# Patient Record
Sex: FEMALE | Race: WHITE | Employment: UNEMPLOYED | URBAN - METROPOLITAN AREA
[De-identification: names, ages, dates, MRNs, and addresses within clinical notes are randomized per-mention and may not be internally consistent; named-entity substitution may affect disease eponyms.]

---

## 2017-02-14 ENCOUNTER — TELEPHONE (OUTPATIENT)
Dept: INTERNAL MEDICINE CLINIC | Age: 27
End: 2017-02-14

## 2017-02-14 DIAGNOSIS — Z23 ENCOUNTER FOR IMMUNIZATION: Primary | ICD-10-CM

## 2017-02-14 NOTE — TELEPHONE ENCOUNTER
Per [facility] pharmacy, pt left med out at room temp and it is no good. Please send a new Rx to replace it. Vivotif EC Capsule  Qty: 4 Cap  Directions: Take 1 capsule by mouth every other day for 4 days. Take on an empty stomach. No hot beverages, alcohol, or antibiotics when capsule taken.     Last OV: 10/3/16  No future appts scheduled

## 2017-02-22 NOTE — TELEPHONE ENCOUNTER
----- Message from Pamela Heck sent at 2/22/2017 11:57 AM EST -----  Regarding: Dr Olvera/rx  Pt (p) [phone], pt said she will need another rx that was written for her during her last visit for her upcoming trip  to [facility] this year for Vivotif . The Typhoid  vaccine live oral.    She did not realize she needed to refrigerate it. So she will need another rx faxed to [facility] 60 (f) [phone], she will be leaving in 3 weeks so she will need as soon as possible.

## 2017-02-23 NOTE — TELEPHONE ENCOUNTER
Pt's checking the status of an order for the live oral Typhoid vaccine to be sent to [facility]. Pt need's to get this done ASAP pt leave's in three weeks to go to [facility]. Pt's # [phone] please call once submitted to the pharmacy.

## 2017-02-27 NOTE — TELEPHONE ENCOUNTER
Pt called to check the status of her Typhoid live oral vaccine to see if it had been sent to [facility]. Informed the pt that it had not been sent yet pt state's that she leave's next week for [facility] and really need's the Live Oral or the shot sent to the pharmacy. Pt's # [phone].

## 2017-02-28 ENCOUNTER — TELEPHONE (OUTPATIENT)
Dept: INTERNAL MEDICINE CLINIC | Age: 27
End: 2017-02-28

## 2017-02-28 NOTE — TELEPHONE ENCOUNTER
Patient request to speak with the nurse or Dr. Pawan Sarmiento in regards to her typhoid medication. Per pt, she states she will need another prescription and needs this ASAP. [place] to leave [place] and needs [place] medication before she leaves.   Patient request for a call back today at #[phone]

## 2017-03-01 ENCOUNTER — CLINICAL SUPPORT (OUTPATIENT)
Dept: INTERNAL MEDICINE CLINIC | Age: 27
End: 2017-03-01

## 2017-03-01 DIAGNOSIS — Z23 ENCOUNTER FOR IMMUNIZATION: Primary | ICD-10-CM

## 2017-09-01 ENCOUNTER — OFFICE VISIT (OUTPATIENT)
Dept: OBGYN CLINIC | Age: 27
End: 2017-09-01

## 2017-09-01 ENCOUNTER — PATIENT MESSAGE (OUTPATIENT)
Dept: OBGYN CLINIC | Age: 27
End: 2017-09-01

## 2017-09-01 VITALS
WEIGHT: 123.8 LBS | SYSTOLIC BLOOD PRESSURE: 108 MMHG | HEIGHT: 68 IN | HEART RATE: 70 BPM | BODY MASS INDEX: 18.76 KG/M2 | DIASTOLIC BLOOD PRESSURE: 70 MMHG

## 2017-09-01 DIAGNOSIS — N93.8 DUB (DYSFUNCTIONAL UTERINE BLEEDING): Primary | ICD-10-CM

## 2017-09-01 RX ORDER — CLOMIPHENE CITRATE 50 MG/1
50 TABLET ORAL DAILY
Qty: 5 TAB | Refills: 0 | Status: SHIPPED | OUTPATIENT
Start: 2017-09-01 | End: 2017-09-06

## 2017-09-01 NOTE — PROGRESS NOTES
Pt is here for a new gyn exam. Trying to get pregnant since may 2017. She is having irregular cycles, really painful. She is on her cycle today.

## 2017-09-01 NOTE — MR AVS SNAPSHOT
Visit Information Date & Time Provider Department Dept. Phone Encounter #  
 5/6/0400 52:70 AM Missy Lopez MD [facility] [place] [facility] [id number] [facility] [id number] Upcoming Health Maintenance Date Due INFLUENZA AGE 9 TO ADULT 8/1/2017 PAP AKA CERVICAL CYTOLOGY 9/1/2018 Allergies as of 9/1/2017  Review Complete On: 0/1/4613 By: Missy Lopez MD  
 No Known Allergies Current Immunizations  Reviewed on 10/3/2016 Name Date DTaP 5/13/1996, 8/14/1992, 1991, 1991, 1991 HPV 1/28/2009, 8/5/2008, 6/4/2008 Hep B Vaccine 12/28/1995, 7/21/1995, 6/9/1995 Hib 5/14/1992, 1991, 1991, 1991 Influenza Vaccine 11/23/2009 MMR 5/13/1996, 5/14/1992 Meningococcal (MCV4O) Vaccine 6/4/2008 Poliovirus vaccine 5/13/1996, 8/14/1992, 1991, 1991 Td 4/28/2004 Tdap 7/13/2009 Typhoid Vaccine, Live, Oral 3/1/2017 Not reviewed this visit Vitals BP Pulse Height(growth percentile) Weight(growth percentile) LMP BMI  
 108/70 (BP 1 Location: Left arm, BP Patient Position: Sitting) 70 5' 8.25\" (1.734 m) 123 lb 12.8 oz (56.2 kg) 09/01/2017 (Exact Date) 18.69 kg/m2 OB Status Smoking Status Having regular periods Never Smoker Vitals History BMI and BSA Data Body Mass Index Body Surface Area  
 18.69 kg/m 2 1.65 m 2 Preferred Pharmacy Pharmacy Name Phone [facility] [phone] Your Updated Medication List  
  
   
This list is accurate as of: 9/1/17 11:53 AM.  Always use your most recent med list.  
  
  
  
  
 PNV No12-Iron-FA-DSS-OM-3 29 mg iron-1 mg -50 mg Cpkd Take  by mouth. Introducing hospitals & HEALTH SERVICES! [employer] Wade introduces new test company patient portal. Now you can access parts of your medical record, email your doctor's office, and request medication refills online.    
 
1. In your internet browser, go to [email or web address] 2. Click on the First Time User? Click Here link in the Sign In box. You will see the New Member Sign Up page. 3. Enter your Modbook Access Code exactly as it appears below. You will not need to use this code after youve completed the sign-up process. If you do not sign up before the expiration date, you must request a new code. · Modbook Access Code: [email or web address] Expires: 11/30/2017 11:53 AM 
 
4. Enter the last four digits of your Social Security Number (xxxx) and Date of Birth (mm/dd/yyyy) as indicated and click Submit. You will be taken to the next sign-up page. 5. Create a [employer] ID. This will be your Modbook login ID and cannot be changed, so think of one that is secure and easy to remember. 6. Create a Modbook password. You can change your password at any time. 7. Enter your Password Reset Question and Answer. This can be used at a later time if you forget your password. 8. Enter your e-mail address. You will receive e-mail notification when new information is available in [address] 9. Click Sign Up. You can now view and download portions of your medical record. 10. Click the Download Summary menu link to download a portable copy of your medical information. If you have questions, please visit the Frequently Asked Questions section of the Modbook website. Remember, Modbook is NOT to be used for urgent needs. For medical emergencies, dial 911. Now available from your iPhone and Android! Please provide this summary of care documentation to your next provider. Your primary care clinician is listed as [address]. If you have any questions after today's visit, please call [phone].

## 2017-09-01 NOTE — PROGRESS NOTES
Infertility Evaluation     Van Fournier is a No obstetric history on file. ,  33 y.o. female [facility] Patient's last menstrual period was 09/01/2017 (exact date). who presents with had concerns including Gyn Exam..  Visit today will focus on concerns with annual in near future  She and her partner have been attempting pregnancy for 5 months. They have intercourse 3 times per week. Her menses occur monthly and she has not moliminal symptoms. (breast tenderness, mood changes, bloating, acne). She has not done ovulation testing at home. Her cycles are irregular. Had been on ocp but not on for past 2 years; condoms til recently      The patient denies risk factors for tubal cause of infertility. Her current partner has not successfully impregnated a previous partner in the past. He has the following risk factors for male infertility: none    Previous evaluation:She has not had a previous evaluation for infertility consisting of ovulation kits, basal body temperature charts, hysterosalpingogram, semen analysis. She has not consulted in the past with an infertility specialist.      History reviewed. No pertinent past medical history. Past Surgical History:   Procedure Laterality Date    HX WISDOM TEETH EXTRACTION       Social History     Occupational History    Not on file. Social History Main Topics    Smoking status: Never Smoker    Smokeless tobacco: Never Used    Alcohol use No    Drug use: No    Sexual activity: Yes     Partners: Male     Birth control/ protection: None     Family History   Problem Relation Age of Onset    No Known Problems Mother     No Known Problems Father     No Known Problems Sister        No Known Allergies  Prior to Admission medications    Medication Sig Start Date End Date Taking? Authorizing Provider   PNV No12-Iron-FA-DSS-OM-3 29 mg iron-1 mg -50 mg CPKD Take  by mouth.    Yes Historical Provider        Review of Systems: History obtained from the patient  Constitutional: negative for weight loss, fever, night sweats  Breast: negative for breast lumps, nipple discharge, galactorrhea  GI: negative for change in bowel habits, abdominal pain, black or bloody stools  : negative for frequency, dysuria, hematuria, vaginal discharge  MSK: negative for back pain, joint pain, muscle pain  Skin: negative for itching, rash, hives  Psych: negative for anxiety, depression, change in mood    Objective:    Visit Vitals    /70 (BP 1 Location: Left arm, BP Patient Position: Sitting)    Pulse 70    Ht 5' 8.25\" (1.734 m)    Wt 123 lb 12.8 oz (56.2 kg)    LMP 09/01/2017 (Exact Date)    BMI 18.69 kg/m2       Physical Exam:     Constitutional  · Appearance: well-nourished, well developed, alert, in no acute distress    HENT  · Head and Face: appears normal    ·      Gastrointestinal  · Abdominal Examination: abdomen non-tender to palpation, normal bowel sounds, no masses present  · Liver and spleen: no hepatomegaly present, spleen not palpable  · Hernias: no hernias identified    Genitourinary  · External Genitalia: normal appearance for age, no discharge present, no tenderness present, no inflammatory lesions present, no masses present, no atrophy present  · Vagina: normal vaginal vault without central or paravaginal defects, no discharge present, no inflammatory lesions present, no masses present  · Bladder: non-tender to palpation  · Urethra: appears normal  · Cervix: normal   · Uterus: normal size, shape and consistency  · Adnexa: no adnexal tenderness present, no adnexal masses present  · Perineum: perineum within normal limits, no evidence of trauma, no rashes or skin lesions present  · Anus: anus within normal limits, no hemorrhoids present  · Inguinal Lymph Nodes: no lymphadenopathy present    Skin  · General Inspection: no rash, no lesions identified    Neurologic/Psychiatric  · Mental Status:  · Orientation: grossly oriented to person, place and time  · Mood and Affect: mood normal, affect appropriate    Assessment:  Dysfunctional uterine bleeding and fertility concerns    Plan:   Reviewed history including long standing irregular menses and some findings c/w PCOS. Recommend reading the book, \"Taking charge of your fertility. \"  Check luteal phase labs; discussed possible use of clomid

## 2017-09-05 ENCOUNTER — LAB ONLY (OUTPATIENT)
Dept: OBGYN CLINIC | Age: 27
End: 2017-09-05

## 2017-09-05 DIAGNOSIS — N97.9 INFERTILITY, FEMALE: Primary | ICD-10-CM

## 2017-09-06 LAB
FSH SERPL-ACNC: 7.1 MIU/ML
LH SERPL-ACNC: 14.5 MIU/ML
PROLACTIN SERPL-MCNC: 10.2 NG/ML (ref 4.8–23.3)
TSH SERPL DL<=0.005 MIU/L-ACNC: 1.42 UIU/ML (ref 0.45–4.5)

## 2017-09-06 NOTE — PROGRESS NOTES
I've reviewed your results and all looks fine; if your cycles remain irregular, its safe to try clomid; check in with my nurse, Gaby Guy, with any questions

## 2017-09-24 ENCOUNTER — PATIENT MESSAGE (OUTPATIENT)
Dept: OBGYN CLINIC | Age: 27
End: 2017-09-24

## 2017-09-28 RX ORDER — CLOMIPHENE CITRATE 50 MG/1
50 TABLET ORAL DAILY
Qty: 5 TAB | Refills: 0 | Status: SHIPPED | OUTPATIENT
Start: 2017-09-28 | End: 2017-10-03

## 2018-08-15 LAB
ANTIBODY SCREEN, EXTERNAL: NEGATIVE
HBSAG, EXTERNAL: NEGATIVE
HCT, EXTERNAL: 41.9
HGB, EXTERNAL: 13.6
HIV, EXTERNAL: NEGATIVE
RPR, EXTERNAL: NORMAL
RUBELLA, EXTERNAL: NORMAL
T. PALLIDUM, EXTERNAL: NEGATIVE
TYPE, ABO & RH, EXTERNAL: NORMAL
URINALYSIS, EXTERNAL: NEGATIVE

## 2019-01-11 ENCOUNTER — APPOINTMENT (OUTPATIENT)
Dept: ULTRASOUND IMAGING | Age: 29
End: 2019-01-11
Attending: OBSTETRICS & GYNECOLOGY
Payer: COMMERCIAL

## 2019-01-11 ENCOUNTER — HOSPITAL ENCOUNTER (OUTPATIENT)
Age: 29
Setting detail: OBSERVATION
Discharge: HOME | End: 2019-01-12
Attending: OBSTETRICS & GYNECOLOGY | Admitting: OBSTETRICS & GYNECOLOGY
Payer: COMMERCIAL

## 2019-01-11 PROBLEM — N20.0 KIDNEY STONE COMPLICATING PREGNANCY, THIRD TRIMESTER: Status: ACTIVE | Noted: 2019-01-11

## 2019-01-11 PROBLEM — O26.833 KIDNEY STONE COMPLICATING PREGNANCY, THIRD TRIMESTER: Status: ACTIVE | Noted: 2019-01-11

## 2019-01-11 LAB
APPEARANCE UR: ABNORMAL
BACTERIA URNS QL MICRO: ABNORMAL /HPF
BILIRUB UR QL: NEGATIVE
COLOR UR: ABNORMAL
EPITH CASTS URNS QL MICRO: ABNORMAL /LPF
GLUCOSE UR STRIP.AUTO-MCNC: NEGATIVE MG/DL
HGB UR QL STRIP: ABNORMAL
KETONES UR QL STRIP.AUTO: 40 MG/DL
LEUKOCYTE ESTERASE UR QL STRIP.AUTO: ABNORMAL
NITRITE UR QL STRIP.AUTO: NEGATIVE
PH UR STRIP: 7.5 [PH] (ref 5–8)
PROT UR STRIP-MCNC: NEGATIVE MG/DL
RBC #/AREA URNS HPF: ABNORMAL /HPF (ref 0–5)
SP GR UR REFRACTOMETRY: 1.01 (ref 1–1.03)
UA: UC IF INDICATED,UAUC: ABNORMAL
UROBILINOGEN UR QL STRIP.AUTO: 0.2 EU/DL (ref 0.2–1)
WBC URNS QL MICRO: ABNORMAL /HPF (ref 0–4)
YEAST URNS QL MICRO: PRESENT

## 2019-01-11 PROCEDURE — 74011250636 HC RX REV CODE- 250/636: Performed by: OBSTETRICS & GYNECOLOGY

## 2019-01-11 PROCEDURE — 87086 URINE CULTURE/COLONY COUNT: CPT

## 2019-01-11 PROCEDURE — 81001 URINALYSIS AUTO W/SCOPE: CPT

## 2019-01-11 PROCEDURE — 99285 EMERGENCY DEPT VISIT HI MDM: CPT

## 2019-01-11 PROCEDURE — 74011000258 HC RX REV CODE- 258: Performed by: OBSTETRICS & GYNECOLOGY

## 2019-01-11 PROCEDURE — 99218 HC RM OBSERVATION: CPT

## 2019-01-11 PROCEDURE — 96376 TX/PRO/DX INJ SAME DRUG ADON: CPT

## 2019-01-11 PROCEDURE — 96361 HYDRATE IV INFUSION ADD-ON: CPT

## 2019-01-11 PROCEDURE — 76770 US EXAM ABDO BACK WALL COMP: CPT

## 2019-01-11 PROCEDURE — 96375 TX/PRO/DX INJ NEW DRUG ADDON: CPT

## 2019-01-11 PROCEDURE — 59025 FETAL NON-STRESS TEST: CPT

## 2019-01-11 PROCEDURE — 96365 THER/PROPH/DIAG IV INF INIT: CPT

## 2019-01-11 RX ORDER — SODIUM CHLORIDE, SODIUM LACTATE, POTASSIUM CHLORIDE, CALCIUM CHLORIDE 600; 310; 30; 20 MG/100ML; MG/100ML; MG/100ML; MG/100ML
125 INJECTION, SOLUTION INTRAVENOUS CONTINUOUS
Status: DISCONTINUED | OUTPATIENT
Start: 2019-01-11 | End: 2019-01-12 | Stop reason: HOSPADM

## 2019-01-11 RX ORDER — ACETAMINOPHEN 500 MG
1000 TABLET ORAL
COMMUNITY
End: 2019-03-20

## 2019-01-11 RX ORDER — ONDANSETRON 2 MG/ML
4 INJECTION INTRAMUSCULAR; INTRAVENOUS
Status: DISCONTINUED | OUTPATIENT
Start: 2019-01-11 | End: 2019-01-12 | Stop reason: HOSPADM

## 2019-01-11 RX ORDER — MORPHINE SULFATE 4 MG/ML
2 INJECTION INTRAVENOUS
Status: DISCONTINUED | OUTPATIENT
Start: 2019-01-11 | End: 2019-01-12 | Stop reason: HOSPADM

## 2019-01-11 RX ORDER — ZOLPIDEM TARTRATE 5 MG/1
5 TABLET ORAL
Status: DISCONTINUED | OUTPATIENT
Start: 2019-01-11 | End: 2019-01-12 | Stop reason: HOSPADM

## 2019-01-11 RX ADMIN — CEFTRIAXONE 2 G: 2 INJECTION, POWDER, FOR SOLUTION INTRAMUSCULAR; INTRAVENOUS at 20:33

## 2019-01-11 RX ADMIN — MORPHINE SULFATE 2 MG: 4 INJECTION, SOLUTION INTRAMUSCULAR; INTRAVENOUS at 18:02

## 2019-01-11 RX ADMIN — ONDANSETRON 4 MG: 2 INJECTION INTRAMUSCULAR; INTRAVENOUS at 18:02

## 2019-01-11 RX ADMIN — MORPHINE SULFATE 2 MG: 4 INJECTION, SOLUTION INTRAMUSCULAR; INTRAVENOUS at 22:45

## 2019-01-11 RX ADMIN — SODIUM CHLORIDE, SODIUM LACTATE, POTASSIUM CHLORIDE, AND CALCIUM CHLORIDE 125 ML/HR: 600; 310; 30; 20 INJECTION, SOLUTION INTRAVENOUS at 18:45

## 2019-01-11 RX ADMIN — ONDANSETRON 4 MG: 2 INJECTION INTRAMUSCULAR; INTRAVENOUS at 22:49

## 2019-01-11 RX ADMIN — MORPHINE SULFATE 2 MG: 4 INJECTION, SOLUTION INTRAMUSCULAR; INTRAVENOUS at 20:30

## 2019-01-11 NOTE — PROGRESS NOTES
1700 Patient arrives to L&D, reporting right flank pain. [phone] Dr. Katelin Vernon at bedside assessing the patient, and reviewing the NST., Dr. Katelin Vernon is explaining the course of treatment. Patient verbalizes understanding. 1905 Patient transport is transporting patient to [address] [facility] via a wheelchair. 1911 Verbal shift change report given to Jackson RN (oncoming nurse) by JOSEPH Wilson RN (offgoing nurse). Report included the following information SBAR, Kardex, Intake/Output, MAR, Accordion and Recent Results.

## 2019-01-11 NOTE — PROGRESS NOTES
Progress Note Pt presents to L&D triage with complaint of left sided flank pain that has worsened throughout the day. No obstetric complaints. CE in exam c/th/high. Uncomplicated pregnancy. No gross hematuria. Pain radiates to front left pelvis. No hx kidney stones. Vitals:  Blood pressure 116/70, pulse 75, temperature 99.1 °F (37.3 °C), resp. rate 16, height 5' 8\" (1.727 m), weight 69.4 kg (153 lb), SpO2 100 %. Temp (24hrs), Av.1 °F (37.3 °C), Min:99.1 °F (37.3 °C), Max:99.1 °F (37.3 °C) Exam:  Patient without distress. Abdomen soft,  nontender. Incision dry and clean without erythema. Lower extremities are negative for swelling, cords, or tenderness. Lab/Data Review: 
none Assessment and Plan:  IUP at 30 weeks with left flank pain 1. Check renal ultrasound to assess for calculi. 2.  IV hydration 3. Pain meds PRN 4.   NST cat 1

## 2019-01-11 NOTE — PROGRESS NOTES
- This RN orienting Derian Martin RN, charting and care performed by JOSEPH Wilson RN will be overseen by this nurse.

## 2019-01-12 VITALS
HEART RATE: 93 BPM | WEIGHT: 153 LBS | HEIGHT: 68 IN | SYSTOLIC BLOOD PRESSURE: 99 MMHG | OXYGEN SATURATION: 100 % | RESPIRATION RATE: 14 BRPM | TEMPERATURE: 98.1 F | BODY MASS INDEX: 23.19 KG/M2 | DIASTOLIC BLOOD PRESSURE: 60 MMHG

## 2019-01-12 PROCEDURE — 99218 HC RM OBSERVATION: CPT

## 2019-01-12 PROCEDURE — 96376 TX/PRO/DX INJ SAME DRUG ADON: CPT

## 2019-01-12 PROCEDURE — 74011250636 HC RX REV CODE- 250/636: Performed by: OBSTETRICS & GYNECOLOGY

## 2019-01-12 RX ORDER — NITROFURANTOIN 25; 75 MG/1; MG/1
100 CAPSULE ORAL 2 TIMES DAILY
Qty: 14 CAP | Refills: 0 | Status: SHIPPED | OUTPATIENT
Start: 2019-01-12 | End: 2019-01-19

## 2019-01-12 RX ADMIN — MORPHINE SULFATE 2 MG: 4 INJECTION, SOLUTION INTRAMUSCULAR; INTRAVENOUS at 01:22

## 2019-01-12 NOTE — DISCHARGE INSTRUCTIONS
Kidney Stone: Care Instructions  Your Care Instructions    Kidney stones are formed when salts, minerals, and other substances normally found in the urine clump together. They can be as small as grains of sand or, rarely, as large as golf balls. While the stone is traveling through the ureter, which is the tube that carries urine from the kidney to the bladder, you will probably feel pain. The pain may be mild or very severe. You may also have some blood in your urine. As soon as the stone reaches the bladder, any intense pain should go away. If a stone is too large to pass on its own, you may need a medical procedure to help you pass the stone. The doctor has checked you carefully, but problems can develop later. If you notice any problems or new symptoms, get medical treatment right away. Follow-up care is a key part of your treatment and safety. Be sure to make and go to all appointments, and call your doctor if you are having problems. It's also a good idea to know your test results and keep a list of the medicines you take. How can you care for yourself at home? · Drink plenty of fluids, enough so that your urine is light yellow or clear like water. If you have kidney, heart, or liver disease and have to limit fluids, talk with your doctor before you increase the amount of fluids you drink. · Take pain medicines exactly as directed. Call your doctor if you think you are having a problem with your medicine. ? If the doctor gave you a prescription medicine for pain, take it as prescribed. ? If you are not taking a prescription pain medicine, ask your doctor if you can take an over-the-counter medicine. Read and follow all instructions on the label. · Your doctor may ask you to strain your urine so that you can collect your kidney stone when it passes. You can use a kitchen strainer or a tea strainer to catch the stone. Store it in a plastic bag until you see your doctor again.   Preventing future kidney stones  Some changes in your diet may help prevent kidney stones. Depending on the cause of your stones, your doctor may recommend that you:  · Drink plenty of fluids, enough so that your urine is light yellow or clear like water. If you have kidney, heart, or liver disease and have to limit fluids, talk with your doctor before you increase the amount of fluids you drink. · Limit coffee, tea, and alcohol. Also avoid grapefruit juice. · Do not take more than the recommended daily dose of vitamins C and D.  · Avoid antacids such as Gaviscon, Maalox, Mylanta, or Tums. · Limit the amount of salt (sodium) in your diet. · Eat a balanced diet that is not too high in protein. · Limit foods that are high in a substance called oxalate, which can cause kidney stones. These foods include dark green vegetables, rhubarb, chocolate, wheat bran, nuts, cranberries, and beans. When should you call for help? Call your doctor now or seek immediate medical care if:    · You cannot keep down fluids.     · Your pain gets worse.     · You have a fever or chills.     · You have new or worse pain in your back just below your rib cage (the flank area).     · You have new or more blood in your urine.    Watch closely for changes in your health, and be sure to contact your doctor if:    · You do not get better as expected. Where can you learn more? Go to http://keshawn-isaac.info/. Enter O103 in the search box to learn more about \"Kidney Stone: Care Instructions. \"  Current as of: March 15, 2018  Content Version: 11.8  © 8524-8498 [employer]. Care instructions adapted under license by [employer] (which disclaims liability or warranty for this information). If you have questions about a medical condition or this instruction, always ask your healthcare professional. [address] any warranty or liability for your use of this information.      Pregnancy Precautions: Care Instructions  Your Care Instructions  There is no sure way to prevent labor before your due date ( labor) or to prevent most other pregnancy problems. But there are things you can do to increase your chances of a healthy pregnancy. Go to your appointments, follow your doctor's advice, and take good care of yourself. Eat well, and exercise (if your doctor agrees). And make sure to drink plenty of water. Follow-up care is a key part of your treatment and safety. Be sure to make and go to all appointments, and call your doctor if you are having problems. It's also a good idea to know your test results and keep a list of the medicines you take. How can you care for yourself at home? · Make sure you go to your prenatal appointments. At each visit, your doctor will check your blood pressure. Your doctor will also check to see if you have protein in your urine. High blood pressure and protein in urine are signs of preeclampsia. This condition can be dangerous for you and your baby. · Drink plenty of fluids, enough so that your urine is light yellow or clear like water. Dehydration can cause contractions. If you have kidney, heart, or liver disease and have to limit fluids, talk with your doctor before you increase the amount of fluids you drink. · Tell your doctor right away if you notice any symptoms of an infection, such as:  ? Burning when you urinate. ? A foul-smelling discharge from your vagina. ? Vaginal itching. ? Unexplained fever. ? Unusual pain or soreness in your uterus or lower belly. · Eat a balanced diet. Include plenty of foods that are high in calcium and iron. ? Foods high in calcium include milk, cheese, yogurt, almonds, and broccoli. ? Foods high in iron include red meat, shellfish, poultry, eggs, beans, raisins, whole-grain bread, and leafy green vegetables. · Do not smoke. If you need help quitting, talk to your doctor about stop-smoking programs and medicines.  These can increase your chances of quitting for good. · Do not drink alcohol or use illegal drugs. · Follow your doctor's directions about activity. Your doctor will let you know how much, if any, exercise you can do. · Ask your doctor if you can have sex. If you are at risk for early labor, your doctor may ask you to not have sex. · Take care to prevent falls. During pregnancy, your joints are loose, and your balance is off. Sports such as bicycling, skiing, or in-line skating can increase your risk of falling. And don't ride horses or motorcycles, dive, water ski, scuba dive, or parachute jump while you are pregnant. · Avoid getting very hot. Do not use saunas or hot tubs. Avoid staying out in the sun in hot weather for long periods. Take acetaminophen (Tylenol) to lower a high fever. · Do not take any over-the-counter or herbal medicines or supplements without talking to your doctor or pharmacist first.  When should you call for help? Call 911 anytime you think you may need emergency care. For example, call if:    · You passed out (lost consciousness).     · You have severe vaginal bleeding.     · You have severe pain in your belly or pelvis.     · You have had fluid gushing or leaking from your vagina and you know or think the umbilical cord is bulging into your vagina. If this happens, immediately get down on your knees so your rear end (buttocks) is higher than your head. This will decrease the pressure on the cord until help arrives.   [facility] your doctor now or seek immediate medical care if:    · You have signs of preeclampsia, such as:  ? Sudden swelling of your face, hands, or feet. ? New vision problems (such as dimness or blurring). ? A severe headache.     · You have any vaginal bleeding.     · You have belly pain or cramping.     · You have a fever.     · You have had regular contractions (with or without pain) for an hour.  This means that you have 8 or more within 1 hour or 4 or more in 20 minutes after you change your position and drink fluids.     · You have a sudden release of fluid from your vagina.     · You have low back pain or pelvic pressure that does not go away.     · You notice that your baby has stopped moving or is moving much less than normal.    Watch closely for changes in your health, and be sure to contact your doctor if you have any problems. Where can you learn more? Go to http://keshawn-isaac.info/. Enter [id number] in the search box to learn more about \"Pregnancy Precautions: Care Instructions. \"  Current as of: November 21, 2017  Content Version: 11.8  © 6318-9385 [employer]. Care instructions adapted under license by [employer] (which disclaims liability or warranty for this information). If you have questions about a medical condition or this instruction, always ask your healthcare professional. [address] any warranty or liability for your use of this information. Counting Your Baby's Kicks: Care Instructions  Your Care Instructions  Counting your baby's kicks is one way your doctor can tell that your baby is healthy. Most women--especially in a first pregnancy--feel their baby move for the first time between 16 and 22 weeks. The movement may feel like flutters rather than kicks. Your baby may move more at certain times of the day. When you are active, you may notice less kicking than when you are resting. At your prenatal visits, your doctor will ask whether the baby is active. In your last trimester, your doctor may ask you to count the number of times you feel your baby move. Follow-up care is a key part of your treatment and safety. Be sure to make and go to all appointments, and call your doctor if you are having problems. It's also a good idea to know your test results and keep a list of the medicines you take. How do you count fetal kicks?   · A common method of checking your baby's movement is to count the number of kicks or moves you feel in 1 hour. Ten movements (such as kicks, flutters, or rolls) in 1 hour are normal. Some doctors suggest that you count in the morning until you get to 10 movements. Then you can quit for that day and start again the next day. · Pick your baby's most active time of day to count. This may be any time from morning to evening. · If you do not feel 10 movements in an hour, your baby may be sleeping. Wait for the next hour and count again. When should you call for help? Call your doctor now or seek immediate medical care if:    · You noticed that your baby has stopped moving or is moving much less than normal.    Watch closely for changes in your health, and be sure to contact your doctor if you have any problems. Where can you learn more? Go to http://keshawn-isaac.info/. Enter W129 in the search box to learn more about \"Counting Your Baby's Kicks: Care Instructions. \"  Current as of: November 21, 2017  Content Version: 11.8  © 0303-7286 [employer]. Care instructions adapted under license by [employer] (which disclaims liability or warranty for this information). If you have questions about a medical condition or this instruction, always ask your healthcare professional. [address] any warranty or liability for your use of this information. Weeks 30 to 32 of Your Pregnancy: Care Instructions  Your Care Instructions    You have made it to the final months of your pregnancy. By now, your baby is really starting to look like a baby, with hair and plump skin. As you enter the final weeks of pregnancy, the reality of having a baby may start to set in. This is the time to settle on a name, get your household in order, set up a safe nursery, and find quality  if needed. Doing these things in advance will allow you to focus on caring for and enjoying your new baby.  You may also want to have a tour of your hospital's labor and delivery unit to get a better idea of what to expect while you are in the hospital.  During these last months, it is very important to take good care of yourself and pay attention to what your body needs. If your doctor says it is okay for you to work, don't push yourself too hard. Use the tips provided in this care sheet to ease heartburn and care for varicose veins. If you haven't already had the Tdap shot during this pregnancy, talk to your doctor about getting it. It will help protect your  against pertussis infection. Follow-up care is a key part of your treatment and safety. Be sure to make and go to all appointments, and call your doctor if you are having problems. It's also a good idea to know your test results and keep a list of the medicines you take. How can you care for yourself at home? Pay attention to your baby's movements  · You should feel your baby move several times every day. · Your baby now turns less, and kicks and jabs more. · Your baby sleeps 20 to 45 minutes at a time and is more active at certain times of day. · If your doctor wants you to count your baby's kicks:  ? Empty your bladder, and lie on your side or relax in a comfortable chair. ? Write down your start time. ? Pay attention only to your baby's movements. Count any movement except hiccups. ? After you have counted 10 movements, write down your stop time. ? Write down how many minutes it took for your baby to move 10 times. ? If an hour goes by and you have not recorded 10 movements, have something to eat or drink and then count for another hour. If you do not record 10 movements in either hour, call your doctor. Ease heartburn  · Eat small, frequent meals. · Do not eat chocolate, peppermint, or very spicy foods. Avoid drinks with caffeine, such as coffee, tea, and sodas. · Avoid bending over or lying down after meals. · Talk a short walk after you eat.   · If heartburn is a problem at night, do not eat for 2 hours before bedtime. · Take antacids like Mylanta, Maalox, Rolaids, or Tums. Do not take antacids that have sodium bicarbonate. Care for varicose veins  · Varicose veins are blood vessels that stretch out with the extra blood during pregnancy. Your legs may ache or throb. Most varicose veins will go away after the birth. · Avoid standing for long periods of time. Sit with your legs crossed at the ankles, not the knees. · Sit with your feet propped up. · Avoid tight clothing or stockings. Wear support hose. · Exercise regularly. Try walking for at least 30 minutes a day. Where can you learn more? Go to http://keshawn-isaac.info/. Enter I815 in the search box to learn more about \"Weeks 30 to 32 of Your Pregnancy: Care Instructions. \"  Current as of: November 21, 2017  Content Version: 11.8  © 1984-6239 [employer]. Care instructions adapted under license by [employer] (which disclaims liability or warranty for this information). If you have questions about a medical condition or this instruction, always ask your healthcare professional. [place] [address] any warranty or liability for your use of this information.

## 2019-01-12 NOTE — PROGRESS NOTES
0730 Shift assessment done. Plan of care discussed/call bell within reach. Pt states her right sided flank pain has improved and is now 3/10. Pt is voiding without difficulty and has been passing sediment in her urine. Pt ordered breakfast. NST to follow.

## 2019-01-12 NOTE — PROGRESS NOTES
Dr Lei Deanna made aware of pt ua results, order received to give Rocephine iv. Pharmacy called for dose clarification and order placed. 2230 Pt up to the bathroom. 2235 Pt voided 200cc dark yellow urine, small stone fragment noted upon straining urine.

## 2019-01-12 NOTE — PROGRESS NOTES
AntePartum Progress Note Jose Miguel Dejesus 30w3d Patient admitted for flank pain 30w3d Estimated Date of Delivery: 3/20/19 states she does have rt flank pain. Vitals:   
Patient Vitals for the past 24 hrs: 
 BP Temp Pulse Resp SpO2 Height Weight 19 0936 95/53 (P) 98.6 °F (37 °C) 79      
19 0728 97/57 98.2 °F (36.8 °C) 80 16     
19 0120  98.1 °F (36.7 °C)  16     
19 2038 108/60 98.8 °F (37.1 °C) 85 16     
19 1834     100 %    
19 1829     100 %    
19 1824     100 %    
19 1819     100 %    
19 1814     100 %    
19 1809     100 %    
19 1804     100 %    
19 1759     100 %    
19 1754     100 %    
19 1749     96 %    
19 1742     (!) 79 %    
19 1738 112/65 99.4 °F (37.4 °C) 81 16     
19 1737     100 %    
19 1707      5' 8\" (1.727 m) 69.4 kg (153 lb) 19 1704     100 %    
19 1703 116/70 99.1 °F (37.3 °C) 75 16     
19 1702  99.1 °F (37.3 °C)      Temp (24hrs), Av.8 °F (37.1 °C), Min:98.1 °F (36.7 °C), Max:99.4 °F (37.4 °C) I&O:   No intake/output data recorded. 01/10 1901 -  0700 In: -  
Out: 500 [Urine:500] NST:  Reactive Uterine Activity: None Exam:  Patient without distress. Abdomen soft, non-tender Fundus soft and non tender Lower extremities edema 1+ Labs:  
Recent Results (from the past 24 hour(s)) URINALYSIS W/ REFLEX CULTURE Collection Time: 19  6:14 PM  
Result Value Ref Range Color YELLOW/STRAW Appearance CLOUDY (A) CLEAR Specific gravity 1.015 1.003 - 1.030    
 pH (UA) 7.5 5.0 - 8.0 Protein NEGATIVE  NEG mg/dL Glucose NEGATIVE  NEG mg/dL Ketone 40 (A) NEG mg/dL  Bilirubin NEGATIVE  NEG    
 Blood SMALL (A) NEG Urobilinogen 0.2 0.2 - 1.0 EU/dL Nitrites NEGATIVE  NEG Leukocyte Esterase MODERATE (A) NEG    
 WBC 5-10 0 - 4 /hpf  
 RBC 5-10 0 - 5 /hpf Epithelial cells MODERATE (A) FEW /lpf Bacteria 2+ (A) NEG /hpf  
 UA:UC IF INDICATED URINE CULTURE ORDERED (A) CNI Yeast w/hyphae PRESENT (A) NEG Assessment and Plan:   IUP @ 30w3d Patient Active Problem List  
 Diagnosis Date Noted  Kidney stone complicating pregnancy, third trimester 01/11/2019  
 
renal stone, passed Feels much better. Got one dose of Rocephin, tolerating po. Pain much better. Wants to go home. Rx Macrobid.

## 2019-01-12 NOTE — PROGRESS NOTES
1627 - Dr. Alexander Said at bedside discussing [address] with patient. VORB to DC patient home with antibiotic prescription and FU next week with Dr. Paul Rod. 1020 - I have reviewed discharge instructions with the patient and spouse including kidney stone information, pregnancy precautions, kick counts, week 30-32 pregnancy, follow-up care instructions and mychart. The patient and spouse verbalized understanding and had no additional questions. Prescription given to patient to take home. 1023 - patient ambulating off unit at this time with FOB, personal belongings, prescription and discharge paperwork.

## 2019-01-13 LAB
BACTERIA SPEC CULT: NORMAL
CC UR VC: NORMAL
SERVICE CMNT-IMP: NORMAL

## 2019-02-21 LAB — GRBS, EXTERNAL: NEGATIVE

## 2019-03-17 ENCOUNTER — ANESTHESIA EVENT (OUTPATIENT)
Dept: LABOR AND DELIVERY | Age: 29
End: 2019-03-17
Payer: COMMERCIAL

## 2019-03-17 ENCOUNTER — ANESTHESIA (OUTPATIENT)
Dept: LABOR AND DELIVERY | Age: 29
End: 2019-03-17
Payer: COMMERCIAL

## 2019-03-17 ENCOUNTER — HOSPITAL ENCOUNTER (INPATIENT)
Age: 29
LOS: 3 days | Discharge: HOME | End: 2019-03-20
Attending: OBSTETRICS & GYNECOLOGY | Admitting: OBSTETRICS & GYNECOLOGY
Payer: COMMERCIAL

## 2019-03-17 DIAGNOSIS — Z34.90 PREGNANCY, UNSPECIFIED GESTATIONAL AGE: Primary | ICD-10-CM

## 2019-03-17 LAB
ERYTHROCYTE [DISTWIDTH] IN BLOOD BY AUTOMATED COUNT: 13.7 % (ref 11.5–14.5)
HCT VFR BLD AUTO: 39.9 % (ref 35–47)
HGB BLD-MCNC: 13.8 G/DL (ref 11.5–16)
MCH RBC QN AUTO: 32.3 PG (ref 26–34)
MCHC RBC AUTO-ENTMCNC: 34.6 G/DL (ref 30–36.5)
MCV RBC AUTO: 93.4 FL (ref 80–99)
NRBC # BLD: 0 K/UL (ref 0–0.01)
NRBC BLD-RTO: 0 PER 100 WBC
PLATELET # BLD AUTO: 185 K/UL (ref 150–400)
PMV BLD AUTO: 9.2 FL (ref 8.9–12.9)
RBC # BLD AUTO: 4.27 M/UL (ref 3.8–5.2)
WBC # BLD AUTO: 16.6 K/UL (ref 3.6–11)

## 2019-03-17 PROCEDURE — 74011000250 HC RX REV CODE- 250

## 2019-03-17 PROCEDURE — 77030014125 HC TY EPDRL BBMI -B: Performed by: ANESTHESIOLOGY

## 2019-03-17 PROCEDURE — 00HU33Z INSERTION OF INFUSION DEVICE INTO SPINAL CANAL, PERCUTANEOUS APPROACH: ICD-10-PCS | Performed by: ANESTHESIOLOGY

## 2019-03-17 PROCEDURE — 77010026065 HC OXYGEN MINIMUM MEDICAL AIR: Performed by: OBSTETRICS & GYNECOLOGY

## 2019-03-17 PROCEDURE — 77030011943

## 2019-03-17 PROCEDURE — 75410000003 HC RECOV DEL/VAG/CSECN EA 0.5 HR: Performed by: OBSTETRICS & GYNECOLOGY

## 2019-03-17 PROCEDURE — 51701 INSERT BLADDER CATHETER: CPT

## 2019-03-17 PROCEDURE — 74011000250 HC RX REV CODE- 250: Performed by: ANESTHESIOLOGY

## 2019-03-17 PROCEDURE — 75410000000 HC DELIVERY VAGINAL/SINGLE: Performed by: OBSTETRICS & GYNECOLOGY

## 2019-03-17 PROCEDURE — 99285 EMERGENCY DEPT VISIT HI MDM: CPT

## 2019-03-17 PROCEDURE — 85027 COMPLETE CBC AUTOMATED: CPT

## 2019-03-17 PROCEDURE — 65270000029 HC RM PRIVATE

## 2019-03-17 PROCEDURE — 36415 COLL VENOUS BLD VENIPUNCTURE: CPT

## 2019-03-17 PROCEDURE — 74011250636 HC RX REV CODE- 250/636: Performed by: OBSTETRICS & GYNECOLOGY

## 2019-03-17 PROCEDURE — 65410000002 HC RM PRIVATE OB

## 2019-03-17 PROCEDURE — 76060000078 HC EPIDURAL ANESTHESIA: Performed by: ANESTHESIOLOGY

## 2019-03-17 PROCEDURE — 75410000002 HC LABOR FEE PER 1 HR: Performed by: OBSTETRICS & GYNECOLOGY

## 2019-03-17 PROCEDURE — 51798 US URINE CAPACITY MEASURE: CPT

## 2019-03-17 PROCEDURE — 59025 FETAL NON-STRESS TEST: CPT

## 2019-03-17 RX ORDER — NALOXONE HYDROCHLORIDE 0.4 MG/ML
0.4 INJECTION, SOLUTION INTRAMUSCULAR; INTRAVENOUS; SUBCUTANEOUS AS NEEDED
Status: DISCONTINUED | OUTPATIENT
Start: 2019-03-17 | End: 2019-03-18 | Stop reason: HOSPADM

## 2019-03-17 RX ORDER — SODIUM CHLORIDE, SODIUM LACTATE, POTASSIUM CHLORIDE, CALCIUM CHLORIDE 600; 310; 30; 20 MG/100ML; MG/100ML; MG/100ML; MG/100ML
125 INJECTION, SOLUTION INTRAVENOUS CONTINUOUS
Status: DISCONTINUED | OUTPATIENT
Start: 2019-03-17 | End: 2019-03-20 | Stop reason: HOSPADM

## 2019-03-17 RX ORDER — OXYTOCIN/0.9 % SODIUM CHLORIDE 30/500 ML
0-20 PLASTIC BAG, INJECTION (ML) INTRAVENOUS
Status: DISCONTINUED | OUTPATIENT
Start: 2019-03-17 | End: 2019-03-20 | Stop reason: HOSPADM

## 2019-03-17 RX ORDER — HYDROMORPHONE HYDROCHLORIDE 2 MG/ML
1 INJECTION, SOLUTION INTRAMUSCULAR; INTRAVENOUS; SUBCUTANEOUS ONCE
Status: COMPLETED | OUTPATIENT
Start: 2019-03-17 | End: 2019-03-17

## 2019-03-17 RX ORDER — NALOXONE HYDROCHLORIDE 0.4 MG/ML
0.4 INJECTION, SOLUTION INTRAMUSCULAR; INTRAVENOUS; SUBCUTANEOUS AS NEEDED
Status: DISCONTINUED | OUTPATIENT
Start: 2019-03-17 | End: 2019-03-17 | Stop reason: SDUPTHER

## 2019-03-17 RX ORDER — FENTANYL/BUPIVACAINE/NS/PF 2-1250MCG
1-16 PREFILLED PUMP RESERVOIR EPIDURAL CONTINUOUS
Status: DISCONTINUED | OUTPATIENT
Start: 2019-03-17 | End: 2019-03-18 | Stop reason: HOSPADM

## 2019-03-17 RX ORDER — BUPIVACAINE HYDROCHLORIDE 2.5 MG/ML
INJECTION, SOLUTION EPIDURAL; INFILTRATION; INTRACAUDAL AS NEEDED
Status: DISCONTINUED | OUTPATIENT
Start: 2019-03-17 | End: 2019-03-18 | Stop reason: HOSPADM

## 2019-03-17 RX ORDER — ONDANSETRON 2 MG/ML
4 INJECTION INTRAMUSCULAR; INTRAVENOUS
Status: DISCONTINUED | OUTPATIENT
Start: 2019-03-17 | End: 2019-03-18 | Stop reason: HOSPADM

## 2019-03-17 RX ORDER — EPHEDRINE SULFATE 50 MG/ML
INJECTION, SOLUTION INTRAVENOUS
Status: DISCONTINUED
Start: 2019-03-17 | End: 2019-03-17 | Stop reason: WASHOUT

## 2019-03-17 RX ORDER — LIDOCAINE HYDROCHLORIDE AND EPINEPHRINE 20; 5 MG/ML; UG/ML
INJECTION, SOLUTION EPIDURAL; INFILTRATION; INTRACAUDAL; PERINEURAL AS NEEDED
Status: DISCONTINUED | OUTPATIENT
Start: 2019-03-17 | End: 2019-03-18 | Stop reason: HOSPADM

## 2019-03-17 RX ORDER — SODIUM CHLORIDE 0.9 % (FLUSH) 0.9 %
5-40 SYRINGE (ML) INJECTION AS NEEDED
Status: DISCONTINUED | OUTPATIENT
Start: 2019-03-17 | End: 2019-03-20 | Stop reason: HOSPADM

## 2019-03-17 RX ADMIN — OXYTOCIN-SODIUM CHLORIDE 0.9% IV SOLN 30 UNIT/500ML 2 MILLI-UNITS/MIN: 30-0.9/5 SOLUTION at 21:55

## 2019-03-17 RX ADMIN — SODIUM CHLORIDE, SODIUM LACTATE, POTASSIUM CHLORIDE, AND CALCIUM CHLORIDE 999 ML/HR: 600; 310; 30; 20 INJECTION, SOLUTION INTRAVENOUS at 12:24

## 2019-03-17 RX ADMIN — BUPIVACAINE HYDROCHLORIDE 4 ML: 2.5 INJECTION, SOLUTION EPIDURAL; INFILTRATION; INTRACAUDAL at 13:35

## 2019-03-17 RX ADMIN — LIDOCAINE HYDROCHLORIDE AND EPINEPHRINE 3 ML: 20; 5 INJECTION, SOLUTION EPIDURAL; INFILTRATION; INTRACAUDAL; PERINEURAL at 13:35

## 2019-03-17 RX ADMIN — Medication 12 ML/HR: at 19:59

## 2019-03-17 RX ADMIN — SODIUM CHLORIDE, SODIUM LACTATE, POTASSIUM CHLORIDE, AND CALCIUM CHLORIDE 500 ML/HR: 600; 310; 30; 20 INJECTION, SOLUTION INTRAVENOUS at 23:51

## 2019-03-17 RX ADMIN — SODIUM CHLORIDE, SODIUM LACTATE, POTASSIUM CHLORIDE, AND CALCIUM CHLORIDE 125 ML/HR: 600; 310; 30; 20 INJECTION, SOLUTION INTRAVENOUS at 15:00

## 2019-03-17 RX ADMIN — HYDROMORPHONE HYDROCHLORIDE 1 MG: 2 INJECTION INTRAMUSCULAR; INTRAVENOUS; SUBCUTANEOUS at 11:52

## 2019-03-17 RX ADMIN — Medication 12 ML/HR: at 13:42

## 2019-03-17 RX ADMIN — SODIUM CHLORIDE, SODIUM LACTATE, POTASSIUM CHLORIDE, AND CALCIUM CHLORIDE 999 ML/HR: 600; 310; 30; 20 INJECTION, SOLUTION INTRAVENOUS at 11:31

## 2019-03-17 NOTE — PROGRESS NOTES
185: Bedside and Verbal shift change report given to TATE Bowling RN (oncoming nurse) by Dina Barber RN (offgoing nurse). Report included the following information SBAR, Kardex, Intake/Output, MAR, Accordion, Recent Results and Med Rec Status. Assumed care of pt at this time. Assessment performed. Pt denies HA, visual disturbances, RUQ pain. Pt denies need of anything at this time. : Ciera notified that pt to begin pushing. : Handle bars    : Tug of war. : Call placed to [facility] MD for pitocin orders and reduce epidural in half. TORB     2340: MD at bedside. IUPC placed. Pt to rest until adequate ctx. 0015: Ice cloths applied to abdomen. 4884: MD called regarding pt fever. TORB 1g tylenol now. 0136: Ciera calls to nursing station for pt update. RN to perform SVE at this time. 0142: Pt begins pushing again at this time. 8894Jose Grimm MD at bedside to evaluate pt. Pt to continue pushing at this time. 0091: Straight cath attempt. 20cc bloody urine. 6555: Mec noted by PHYLICIA Harding.Nini: MD at bedside     0329: Vacuum applied e2hwxci before x1 pop off. Vacuum reapplied. 3550: Episiotomy performed by [facility] MD      8788: Delivery of viable  females placed skin to skin with mother. Delayed cord clamping performed. FOB cuts cord. QBL 140mL     0715: Bedside and Verbal shift change report given to Parul Hernández (oncoming nurse) by Sarika Burns RN  (offgoing nurse). Report included the following information SBAR, Kardex, Intake/Output, MAR, Recent Results and Med Rec Status.

## 2019-03-17 NOTE — PROGRESS NOTES
80 Dr Rahat Lr at bedside to assess labor progress. SVE 8/100/0. AROM/clear    1700 Pt sitting up in high fowlers drinking broth. 1044 [address] Dr Rahat Lr at bedside.   SVE 8-9/100/+1

## 2019-03-17 NOTE — ANESTHESIA PROCEDURE NOTES
Epidural Block    Start time: 3/17/2019 1:27 PM  End time: 3/17/2019 1:45 PM  Performed by: Fabian Kimbrough MD  Authorized by:  Fabian Kimbrough MD     Pre-Procedure  Indication: labor epidural    Preanesthetic Checklist: patient identified, risks and benefits discussed, anesthesia consent, timeout performed and anesthesia consent      Epidural:   Patient position:  Seated  Prep region:  Lumbar  Prep: Betadine    Location:  L3-4    Needle and Epidural Catheter:   Needle Type:  Tuohy  Needle Gauge:  17 G  Injection Technique:  Loss of resistance using saline  Attempts:  1  Catheter Size:  18 G  Events: no paresthesia and negative aspiration test    Test Dose:  Lidocaine 1.5% w/ epi and negative    Assessment:   Catheter Secured:  Tegaderm and tape  Insertion:  Uncomplicated  Patient tolerance:  Patient tolerated the procedure well with no immediate complications

## 2019-03-17 NOTE — PROGRESS NOTES
Labor Note    Mae Sos  [id number]  1990   39w4d      S:  Feeling comfortable with epidural    O:    Visit Vitals  BP (!) 85/53   Pulse 65   Temp (P) 97.8 °F (36.6 °C)   Resp 16   Ht 5' 8\" (1.727 m)   Wt 73.5 kg (162 lb)   SpO2 98%   BMI 24.63 kg/m²     Cervical Exam  Dilation (cm): /0  Vaginal exam done by? : Bee Fan MD   Membrane Status: AROM - clear     Patient Vitals for the past 4 hrs: Mode Fetal Heart Rate Fetal Activity Variability Decelerations Accelerations RN Reviewed Strip? Provider who reviewed strip?   19 1515 External 135  6-25 BPM None No Yes    19 1500       Yes    19 1445 External 135  6-25 BPM None Yes Yes    19 1415 External 135 Present 6-25 BPM None Yes Yes    19 1400       Yes    19 1345 External 130  6-25 BPM None Yes Yes Dr. Bee Fan   19 1330 External 125  6-25 BPM None Yes Yes    19 1300 External 130  6-25 BPM None No Yes Dr. Bee Fan   19 1230 External 130  6-25 BPM None No Yes Dr. Bee Fan   19 1224 Kyra Horde Yes Dr. Bee Fan       A/P:  30 y.o.  @ 39w4d - labor   1. CEFM/[place]  2. GBS - / Rh+  3. Pitocin not indicated   4. Pain control - epidural   5. David prn. Expect .       Marla Lugo MD  [facility]

## 2019-03-17 NOTE — H&P
History & Physical    Name: Mally Fairbanks MRN: [id number]  SSN: [id number]    YOB: 1990  Age: 30 y.o. Sex: female        Subjective:     Estimated Date of Delivery: 3/20/19  OB History    Para Term  AB Living   1             SAB TAB Ectopic Molar Multiple Live Births                    # Outcome Date GA Lbr Tramaine/2nd Weight Sex Delivery Anes PTL Lv   1 Current                   Ms. Tammy Walker is admitted with pregnancy at 39w4d for active labor. Prenatal course was c/b renal stone. Please see prenatal records for details. Came into L&D today with c/o ctx. Was 2cm on arrival, however changed to 4cm with walking and ctx intensified. Past Medical History:   Diagnosis Date    Kidney stone complicating pregnancy, third trimester 2019     Past Surgical History:   Procedure Laterality Date    HX OTHER SURGICAL  2009    HX WISDOM TEETH EXTRACTION      HX WISDOM TEETH EXTRACTION       Social History     Occupational History    Not on file   Tobacco Use    Smoking status: Never Smoker    Smokeless tobacco: Never Used   Substance and Sexual Activity    Alcohol use: No    Drug use: No    Sexual activity: Yes     Partners: Male     Birth control/protection: None     Family History   Problem Relation Age of Onset    No Known Problems Mother     No Known Problems Father     No Known Problems Sister        No Known Allergies  Prior to Admission medications    Medication Sig Start Date End Date Taking? Authorizing Provider   PNV No12-Iron-FA-DSS-OM-3 29 mg iron-1 mg -50 mg CPKD Take  by mouth. Yes Provider, Historical   acetaminophen (TYLENOL EXTRA STRENGTH) 500 mg tablet Take 1,000 mg by mouth every six (6) hours as needed for Pain. Provider, Historical        Review of Systems: A comprehensive review of systems was negative except for that written in the HPI.     Objective:     Vitals:  Vitals:    19 1139 19 1144 19 1149 19 1154   BP: Pulse:       Resp:       Temp:       SpO2: 100% 100% 96% 100%   Weight:       Height:            Physical Exam:  Patient without distress. Heart: Regular rate and rhythm  Lung: normal respiratory effort  Abdomen: soft, nontender  Fundus: soft and non tender  Perineum: blood absent, amniotic fluid absent  Cervical Exam: 4/80/-1 per RN   Lower Extremities: WWP   Membranes:  Intact  Fetal Heart Rate: Reactive    Prenatal Labs:   Lab Results   Component Value Date/Time    Rubella, External Immune 08/15/2018    GrBStrep, External Negative 02/21/2019    HBsAg, External Negative 08/15/2018    HIV, External Negative 08/15/2018    RPR, External Non-Reactive 08/15/2018    ABO,Rh AB Positive 08/15/2018         Assessment/Plan:     Active Problems:  IUP @ 39w4d   Labor        Plan: Admit for Reassuring fetal status, Labor  Progressing normally  Continue expectant management, Continue plan for vaginal delivery. Group B Strep was negative.   Consented  IEFM/Tocol   Considering epidural     Signed By:  Dot Chavez MD     March 17, 2019

## 2019-03-17 NOTE — ROUTINE PROCESS
4908: Patient admitted to triage following complaints of natalie q 6-7 minutes since 0430 this morning. Pt states no leaking of vaginal fluid or bleeding. Pt changed into gown and oriented to room and unit. Plan of care discussed with pt, pt verbalizing understanding. 0730: Pt placed on FHR and toco monitor, vital signs taken and assessment completed    0734: SVE per this RN, this RN unable to reach posterior cervix    0736: SVE per PEGGY Bustos, 2/80/posterior cervix noted    2303: This RN calling Dr. Patti Garcia at this time to update MD on pt status and SVE, MD TORB allow pt to ambulate on unit for 1 hour following reactive NST prior to cervical recheck. Pt to PO hydrate and MD TORB 1 liter LR bolus if pt unable to hydrate PO.     0747: This RN discussing plan of care with pt, pt verbalizing understanding at this time    [phone]: This RN taking pt off of FHR and toco monitor at this time for ambulation in hallway. Pt to return to room for monitoring following 1 hour of ambulation. 0800: Pt ambulating in hallway with spouse at this time    0856: Pt returning to pt bed and being placed back on FHR and toco monitor. Plan is for pt to continue to ambulate following monitoring. Pt verbalizing agreement with plan    7309: This RN taking pt off of FHR and toco monitor at this time for ambulation in hallway    1014: SVE per PEGGY Narayan 4/80 noted. Cervix noted to be slightly more anterior than prior    1030: This RN removing pt from [facility] monitor at this time for ambulation    1040: This RN updating Dr. Patti Garcia on pt status, MD verbalizing understanding, no further orders received at this time    [phone]: This RN placing pt back on FHR and toco monitor, pt requesting epidural    1136: This RN calling Dr. Patti Garcia at this time to update MD on pt status and pt request for epidural and possible IV pain medication prior to epidural. MD TORB 1mg IV dilaudid for pain and pt may get epidural whenever she requests.  No further orders received at this time    1142: This RN discussing plan of care with pt and pt spouse at this time, pt and spouse verbalizing understanding. Pt stating she would like IV dilaudid prior to epidural placement    1224: Dr. Brian Siegel at pt bedside at this time for pt assessment. MD discussing plan of care with pt at this time, pt verbalizing understanding. Plan is for pt to get her epidural shortly per pt request.     1233: Dr. Brian Siegel leaving pt bedside at this time    [phone]: Pt informing this RN that she does not want epidural at this time and will inform this RN when she is ready for epidural    1345: Bedside, Verbal and Written shift change report given to SHEILA Oro (oncoming nurse) by Helen Dudley RN (offgoing nurse). Report included the following information SBAR, Kardex, Intake/Output, MAR, Accordion and Recent Results\.

## 2019-03-18 PROCEDURE — 75410000002 HC LABOR FEE PER 1 HR: Performed by: OBSTETRICS & GYNECOLOGY

## 2019-03-18 PROCEDURE — 74011250636 HC RX REV CODE- 250/636: Performed by: OBSTETRICS & GYNECOLOGY

## 2019-03-18 PROCEDURE — 74011250637 HC RX REV CODE- 250/637: Performed by: OBSTETRICS & GYNECOLOGY

## 2019-03-18 PROCEDURE — 77030021125

## 2019-03-18 PROCEDURE — 77030011943

## 2019-03-18 PROCEDURE — 65270000029 HC RM PRIVATE

## 2019-03-18 RX ORDER — NALOXONE HYDROCHLORIDE 0.4 MG/ML
0.4 INJECTION, SOLUTION INTRAMUSCULAR; INTRAVENOUS; SUBCUTANEOUS AS NEEDED
Status: DISCONTINUED | OUTPATIENT
Start: 2019-03-18 | End: 2019-03-20 | Stop reason: HOSPADM

## 2019-03-18 RX ORDER — OXYTOCIN/RINGER'S LACTATE 20/1000 ML
125-500 PLASTIC BAG, INJECTION (ML) INTRAVENOUS ONCE
Status: ACTIVE | OUTPATIENT
Start: 2019-03-18 | End: 2019-03-18

## 2019-03-18 RX ORDER — ACETAMINOPHEN 325 MG/1
650 TABLET ORAL
Status: DISCONTINUED | OUTPATIENT
Start: 2019-03-18 | End: 2019-03-20 | Stop reason: HOSPADM

## 2019-03-18 RX ORDER — METHYLERGONOVINE MALEATE 0.2 MG/ML
0.2 INJECTION INTRAVENOUS ONCE
Status: ACTIVE | OUTPATIENT
Start: 2019-03-18 | End: 2019-03-18

## 2019-03-18 RX ORDER — LIDOCAINE HYDROCHLORIDE AND EPINEPHRINE 15; 5 MG/ML; UG/ML
INJECTION, SOLUTION EPIDURAL AS NEEDED
Status: DISCONTINUED | OUTPATIENT
Start: 2019-03-18 | End: 2019-03-18 | Stop reason: HOSPADM

## 2019-03-18 RX ORDER — ONDANSETRON 4 MG/1
4 TABLET, ORALLY DISINTEGRATING ORAL
Status: ACTIVE | OUTPATIENT
Start: 2019-03-18 | End: 2019-03-19

## 2019-03-18 RX ORDER — HYDROCORTISONE ACETATE PRAMOXINE HCL 2.5; 1 G/100G; G/100G
CREAM TOPICAL AS NEEDED
Status: DISCONTINUED | OUTPATIENT
Start: 2019-03-18 | End: 2019-03-20 | Stop reason: HOSPADM

## 2019-03-18 RX ORDER — ACETAMINOPHEN 500 MG
1000 TABLET ORAL
Status: COMPLETED | OUTPATIENT
Start: 2019-03-18 | End: 2019-03-18

## 2019-03-18 RX ORDER — OXYCODONE AND ACETAMINOPHEN 5; 325 MG/1; MG/1
1 TABLET ORAL
Status: DISCONTINUED | OUTPATIENT
Start: 2019-03-18 | End: 2019-03-20 | Stop reason: HOSPADM

## 2019-03-18 RX ORDER — IBUPROFEN 800 MG/1
800 TABLET ORAL EVERY 8 HOURS
Status: DISCONTINUED | OUTPATIENT
Start: 2019-03-18 | End: 2019-03-20 | Stop reason: HOSPADM

## 2019-03-18 RX ORDER — ZOLPIDEM TARTRATE 5 MG/1
5 TABLET ORAL
Status: DISCONTINUED | OUTPATIENT
Start: 2019-03-18 | End: 2019-03-20 | Stop reason: HOSPADM

## 2019-03-18 RX ADMIN — LIDOCAINE HYDROCHLORIDE AND EPINEPHRINE 7 ML: 15; 5 INJECTION, SOLUTION EPIDURAL at 00:14

## 2019-03-18 RX ADMIN — IBUPROFEN 800 MG: 800 TABLET ORAL at 05:29

## 2019-03-18 RX ADMIN — ACETAMINOPHEN 1000 MG: 500 TABLET ORAL at 00:29

## 2019-03-18 RX ADMIN — SODIUM CHLORIDE, SODIUM LACTATE, POTASSIUM CHLORIDE, AND CALCIUM CHLORIDE 500 ML/HR: 600; 310; 30; 20 INJECTION, SOLUTION INTRAVENOUS at 00:04

## 2019-03-18 RX ADMIN — IBUPROFEN 800 MG: 800 TABLET ORAL at 13:35

## 2019-03-18 RX ADMIN — OXYTOCIN-SODIUM CHLORIDE 0.9% IV SOLN 30 UNIT/500ML 4 MILLI-UNITS/MIN: 30-0.9/5 SOLUTION at 03:25

## 2019-03-18 RX ADMIN — IBUPROFEN 800 MG: 800 TABLET ORAL at 21:30

## 2019-03-18 NOTE — PROGRESS NOTES
07:20- OB SBAR report received from PEGGY HedrickRN at bedside for shift change. 09:00- Pt ambulates to BR with standby assistance. Steady gait. Successfully voided 150cc urine. 09:30- OB SBAR report given to MIU PHYLICIA Kolb. Dual skin assessment performed. Bands verified.

## 2019-03-18 NOTE — PROGRESS NOTES
Bedside and Verbal shift change report given to Thomas Garsia (oncoming nurse) by Carrie Andujar. Miriam Ricci (offgoing nurse). Report given with SBAR, Kardex, Intake/Output and MAR.

## 2019-03-18 NOTE — DISCHARGE SUMMARY
Patient ID:  Phyllis Ring  [id number]  35 y.o.  1990    Admit Date: 3/17/2019    Discharge Date: 3/20/2019     Admitting Physician: Mana Cee MD    Attending Physician: Madi Porter MD    Admission Diagnoses: Pregnancy [Z34.90]    Procedures for this admission:     Discharge Diagnoses: Same as above with vaginally with vacuum producing a viable infant. Information for the patient's :  Monika Winkler [[id number]]       Delivered by Emmett Koch MD      Discharge Disposition:  home    Discharge Condition:  stable    Additional Diagnoses: labor. Maternal Labs:   Lab Results   Component Value Date/Time    HBsAg, External Negative 08/15/2018    HIV, External Negative 08/15/2018    Rubella, External Immune 08/15/2018    RPR, External Non-Reactive 08/15/2018    GrBStrep, External Negative 2019       Cord Blood Results:   Information for the patient's :  Pietro Lauren Female Joy Saavedra [[id number]]   No results found for: PCTABR, ABORH, PCTDIG, BILI, ABORH, ABORHEXT          History of Present Illness:   OB History      Para Term  AB Living    1 1 1     1    SAB TAB Ectopic Molar Multiple Live Births            0 1        Admitted for active labor. Hospital Course:   Patient was admitted as above and delivered via vagina . Please the chart for details. The postpartum course was unremarkable. She was deemed stable for discharge home on day 2.     Follow-up Care: 4-6 w        Signed:  Corina Spears MD  3/18/2019  8:39 AM

## 2019-03-18 NOTE — LACTATION NOTE
Elliot Carter   Lactation Consultant      Progress Notes   Signed   Date of Service:  03/18/19 1844                  Problem: Lactation Care Plan  Goal: *Infant latching appropriately  Outcome: Progressing Towards Goal  Pt will successfully establish breastfeeding by feeding in response to infant's early feeding cues and/or to offer breast every 2-3 hours. Ways to obtain a deep latch and seek comfortable positioning shared, aware to keep log of feedings/output. Goal: *Weight loss less than 10% of birth weight  Outcome: Progressing Towards Goal     Encouraged mom to attempt feeding with baby led feeding cues. Just as sucking on fingers, rooting, mouthing. Looking for 8-12 feedings in 24 hours. Don't limit baby at breast, allow baby to come of breast on it's own. Baby may want to feed  often and may increase number of feedings on second day of life. Skin to skin encouraged.       If baby doesn't nurse,  Mom should  hand express  10-20 drops of colostrum and drip into baby's mouth, or give to baby by finger feeding, cup feeding, or spoon feeding at least every 2-3 hours.      Problem: Patient Education: Go to Patient Education Activity  Goal: Patient/Family Education  Outcome: Progressing Towards Goal  Discussed with mother her plan for feeding. Reviewed the benefits of exclusive breast milk feeding during the hospital stay. Informed her of the risks of using formula to supplement in the first few days of life as well as the benefits of successful breast milk feeding; referred her to the Breastfeeding booklet about this information. She acknowledges understanding of information reviewed and states that it is her plan to breastfeed her infant. Will support her choice and offer additional information as needed.      Hand Expression Education:  Mom taught how to manually hand express her colostrum.   Emphasized the importance of providing infant with valuable colostrum as infant rests skin to skin at breast. Aware to avoid extended periods of non-feeding. Aware to offer 10-20+ drops of colostrum every 2-3 hours until infant is latching and nursing effectively. Taught the rationale behind this low tech but highly effective evidence based practice.     Comments: Pt will successfully establish breastfeeding by feeding in response to early feeding cues   or wake every 3h, will obtain deep latch, and will keep log of feedings/output. Taught to BF at hunger cues and or q 2-3 hrs and to offer 10-20 drops of hand expressed colostrum at any non-feeds.       Breast Assessment  Left Breast: Small , Medium  Left Nipple: Everted, Intact, Short  Right Breast: Small , Medium  Right Nipple: Everted, Intact, Short  Breast- Feeding Assessment  Attends Breast-Feeding Classes: Yes  Breast-Feeding Experience: No  Breast Trauma/Surgery: No  Type/Quality: Good(Mother states baby  well after delivery. Baby has been sleepy and mother has given some drops.)  Lactation Consultant Visits  Breast-Feedings: Good (Baby had some difficulty latching on. Motherj tried for 15 minutes. She was able to express a few drops of colostrum into babys mouth. Baby did latch on with shield and nursed for 10 min.  Colostrum seen in shield )  Mother/Infant Observation  Mother Observation: Alignment, Lets baby end feeding, Close hold, Holds breast, Recognizes feeding cues  Infant Observation: Audible swallows, Latches nipple and aereolae, Relaxed after feeding, Lips flanged, lower, Rhythmic suck, Lips flanged, upper, Frenulum checked, Opens mouth  LATCH Documentation  Latch: Grasps breast, tongue down, lips flanged, rhythmic sucking  Audible Swallowing: A few with stimulation  Type of Nipple: Everted (after stimulation)  Comfort (Breast/Nipple): Soft/non-tender  Hold (Positioning): Full assist, teach one side, mother does other, staff holds  [facility] Score: 8

## 2019-03-18 NOTE — L&D DELIVERY NOTE
Delivery Summary    Patient: Ramona Dodson MRN: [id number]  SSN: [id number]    YOB: 1990  Age: 30 y.o. Sex: female       Information for the patient's :  Deepthi Yusuf, Female Karely Bah [[id number]]       Labor Events:    Labor: No   Rupture Date: 3/17/2019   Rupture Time: 4:15 PM   Rupture Type AROM   Amniotic Fluid Volume:      Amniotic Fluid Description: Clear None   Induction: None       Augmentation:     Labor Events:       Cervical Ripening:     None     Delivery Events:  Episiotomy: Midline   Laceration(s):       Repaired: Yes    Number of Repair Packets:     Suture Type and Size: Vicryl 3-0     Estimated Blood Loss (ml):  ml       Delivery Date: 3/18/2019    Delivery Time: 3:34 AM  Delivery Type: Vaginal, Spontaneous  Sex:  Female     Gestational Age: 38w11d   Delivery Clinician:  Sierra Rivera  Living Status: Living   Delivery Location: [place] [address]          APGARS  One minute Five minutes Ten minutes   Skin color: 0   1        Heart rate: 2   2        Grimace: 2   2        Muscle tone: 2   2        Breathin   2        Totals: 8   9            Presentation: Vertex    Position:   Occiput    Resuscitation Method:  Tactile Stimulation;Suctioning-bulb     Meconium Stained: Other (Comment) terminal meconium    Cord Information: 3 Vessels  Complications: None  Cord around:    Delayed cord clamping? Yes  Cord clamped date/time:3/18/2019  3:35 AM  Disposition of Cord Blood: Discard    Blood Gases Sent?: No    Placenta:  Date/Time: 3/18/2019  3:36 AM  Removal: Spontaneous      Appearance: Normal      Measurements:  Birth Weight:        Birth Length:        Head Circumference:        Chest Circumference:       Abdominal Girth:       Other Providers:   Kacey SUMNER;NEL SUN;;LONDON FREY;JAMES GARIBAY;CORTNEY SMITH P, Obstetrician;Primary Nurse;Primary  Nurse;Charge Nurse;Nicu Nurse;Nurse Practitioner           Group B Strep:   Lab Results   Component Value Date/Time    GrBStrep, External Negative 2019     Information for the patient's :  Felecia Sero [[id number]]   No results found for: ABORH, PCTABR, PCTDIG, BILI, ABORHEXT, ABORH    No results for input(s): PCO2CB, PO2CB, HCO3I, SO2I, IBD, PTEMPI, SPECTI, PHICB, ISITE, IDEV, IALLEN in the last 72 hours.

## 2019-03-19 VITALS
TEMPERATURE: 98.3 F | RESPIRATION RATE: 16 BRPM | OXYGEN SATURATION: 96 % | HEIGHT: 68 IN | DIASTOLIC BLOOD PRESSURE: 75 MMHG | HEART RATE: 75 BPM | BODY MASS INDEX: 24.55 KG/M2 | WEIGHT: 162 LBS | SYSTOLIC BLOOD PRESSURE: 106 MMHG

## 2019-03-19 PROCEDURE — 74011250637 HC RX REV CODE- 250/637: Performed by: OBSTETRICS & GYNECOLOGY

## 2019-03-19 PROCEDURE — 65270000029 HC RM PRIVATE

## 2019-03-19 RX ADMIN — IBUPROFEN 800 MG: 800 TABLET ORAL at 05:40

## 2019-03-19 RX ADMIN — IBUPROFEN 800 MG: 800 TABLET ORAL at 21:26

## 2019-03-19 RX ADMIN — MUPIROCIN: 20 OINTMENT TOPICAL at 21:26

## 2019-03-19 RX ADMIN — IBUPROFEN 800 MG: 800 TABLET ORAL at 13:49

## 2019-03-19 NOTE — PROGRESS NOTES
Post-Partum Day Number 1 Progress Note    Patient doing well post-partum without significant complaint. Vitals:    Patient Vitals for the past 8 hrs:   BP Temp Pulse Resp   19 0524 98/52 98.2 °F (36.8 °C) 72 16     Temp (24hrs), Av.1 °F (36.7 °C), Min:97.7 °F (36.5 °C), Max:98.2 °F (36.8 °C)      Vital signs stable, afebrile. Exam:  Patient without distress. Abdomen soft, fundus firm below umbilicus, nontender                              Lower extremities are negative for swelling, cords or tenderness. Lab/Data Review: All lab results for the last 24 hours reviewed. Assessment and Plan:  Patient appears to be having uncomplicated post-partum course. Continue routine perineal care and maternal education. Plan discharge tomorrow if no problems occur.

## 2019-03-19 NOTE — LACTATION NOTE
This note was copied from a baby's chart. Assisted parents with feeding. Baby latching however no suck achieved. Mother able to easily express drops to baby. Mother states she has been feeding with shield. Shield applied, baby resting with shield in mouth. Encouraged mother continue to attempt as long as baby is showing feeding cues. Parents questions answered. Reviewed breastfeeding techniques and positions with mother until found a position she was most comfortable with. Reminded mother of early feeding cues and that breast fed infants should be fed on demand without time restriction on the first breast until the infant seems satisfied. Then the second breast is offered. Advised mother to awaken  to feed if three hours have passed since baby last ate. Will continue to monitor mother's progress with breastfeeding and offer assistance at any time. Pt will successfully establish breastfeeding by feeding in response to early feeding cues or wake every 3h, will obtain deep latch, and will keep log of feedings/output. Taught to BF at hunger cues and or q 2-3 hrs and to offer 10-20 drops of hand expressed colostrum at any non-feeds. Breast Assessment Left Breast: Medium Left Nipple: Everted, Intact Right Breast: Medium Right Nipple: Everted, Intact Breast- Feeding Assessment Attends Breast-Feeding Classes: Yes Breast-Feeding Experience: No 
Breast Trauma/Surgery: No 
Type/Quality: Good(Mother states baby  well after delivery. Baby has been sleepy and mother has given some drops.) Lactation Consultant Visits Breast-Feedings: Good Mother/Infant Observation Mother Observation: Breast comfortable, Recognizes feeding cues, Holds breast 
Infant Observation: Rhythmic suck, Relaxed after feeding, Opens mouth, Lips flanged, upper, Lips flanged, lower, Latches nipple and aereolae LATCH Documentation Latch: Grasps breast, tongue down, lips flanged, rhythmic sucking Audible Swallowing: A few with stimulation Type of Nipple: Everted (after stimulation) Comfort (Breast/Nipple): Soft/non-tender Hold (Positioning): Full assist, teach one side, mother does other, staff holds LATCH Score: 8

## 2019-03-19 NOTE — ROUTINE PROCESS
Bedside and Verbal shift change report given to TRUDI Ahuja RN (oncoming nurse) by Abbey Martin RN (offgoing nurse). Report included the following information SBAR, Kardex, Intake/Output and MAR.

## 2019-03-20 PROCEDURE — 74011250637 HC RX REV CODE- 250/637: Performed by: OBSTETRICS & GYNECOLOGY

## 2019-03-20 RX ORDER — OXYCODONE AND ACETAMINOPHEN 5; 325 MG/1; MG/1
1 TABLET ORAL
Qty: 6 TAB | Refills: 0 | Status: SHIPPED | OUTPATIENT
Start: 2019-03-20 | End: 2019-03-23

## 2019-03-20 RX ADMIN — IBUPROFEN 800 MG: 800 TABLET ORAL at 07:01

## 2019-03-20 NOTE — PROGRESS NOTES
PostPartum Note Matt Tomas [id number] 1990 
30 y.o. 
 
S:  Ms. Matt Tomas is a 30 y.o.  PPD #2 s/p VAVD @ 39w5d. Doing well. She had a baby girl. Her lochia is like a period. She describes her pain as mild and is well controlled with PO medications. She is breast feeding and this is going well. She is ambulating and voiding. Tolerating PO intake. O:  
Visit Vitals /75 (BP Patient Position: At rest) Pulse 75 Temp 98.3 °F (36.8 °C) Resp 16 Ht 5' 8\" (1.727 m) Wt 73.5 kg (162 lb) SpO2 96% Breastfeeding? Unknown BMI 24.63 kg/m² Lab Results Component Value Date/Time WBC 16.6 (H) 2019 10:29 AM  
 HGB 13.8 2019 10:29 AM  
 HCT 39.9 2019 10:29 AM  
 PLATELET 838  10:29 AM  
 MCV 93.4 2019 10:29 AM  
 Hgb, External 13.6 08/15/2018 Hct, External 41.9 08/15/2018 Gen - No acute distress Abdomen - Fundus firm, below the umbilicus Ext - Warm, well perfused. Nontender A/P:  PPD #2 s/p VAVD @ 39w5d doing well. 1.  Routine PP instructions/ care discussed 2. Blood type - Rh + 
3. Rubella imm 4. Circumcision n/a  
5. Discharge today 6. F/U 4-6 weeks for PP check. Joseline Conti MD 
[facility]

## 2019-03-20 NOTE — PROGRESS NOTES
1015 All discharge teaching complete with patient and . Both verbalize understanding; all questions answered. Patient has scheduled an appointment for post partum visit with Dr. Cinthia Naik. Prescription for percocet given to patient along with all medication administration instructions. Copy of signed discharge papers in chart. 1120 Patient is escorted off unit in wheelchair by hospital volunteer.

## 2019-03-20 NOTE — ROUTINE PROCESS
Bedside and Verbal shift change report given to Dedrick Edmonds RN (oncoming nurse) by Olu Viera RN (offgoing nurse). Report given with SBAR, Kardex, Intake/Output and MAR.

## 2019-03-20 NOTE — DISCHARGE INSTRUCTIONS
POSTPARTUM DISCHARGE INSTRUCTIONS       Name:  Bella Damico  YOB: 1990  Admission Diagnosis:  Pregnancy [Z34.90]     Discharge Diagnosis:    Problem List as of 3/20/2019 Date Reviewed: 9/1/2017          Codes Class Noted - Resolved    Pregnancy ICD-10-CM: Z34.90  ICD-9-CM: V22.2  3/17/2019 - Present        Kidney stone complicating pregnancy, third trimester ICD-10-CM: O26.833, N20.0  ICD-9-CM: 646.23, 592.0  1/11/2019 - Present            Attending Physician:  Cynthia Malagon MD    Delivery Type:  Vaginal Childbirth with Episiotomy, Laceration or Tear: What To Expect At [address] [place] [address] body will slowly heal in the next few weeks. It is easy to get too tired and overwhelmed during the first weeks after your baby is born. Changes in your hormones can shift your mood without warning. You may find it hard to meet the extra demands on your energy and time. Take it easy on yourself. Follow-up care is a key part of your treatment and safety. Be sure to make and go to all appointments, and call your doctor if you are having problems. It's also a good idea to know your test results and keep a list of the medicines you take. How can you care for yourself at home? Vaginal Bleeding and Cramps  · After delivery, you will have a bloody discharge from the vagina. This will turn pink within a week and then white or yellow after about 10 days. It may last for 2 to 4 weeks or longer, until the uterus has healed. Use pads instead of tampons until you stop bleeding. · Do not worry if you pass some blood clots, as long as they are smaller than a golf ball. If you have a tear or stitches in your vaginal area, change the pad at least every 4 hours to prevent soreness and infection. · You may have cramps for the first few days after childbirth. These are normal and occur as the uterus shrinks to normal size.  Take an over-the-counter pain medicine, such as acetaminophen (Tylenol), ibuprofen (Advil, Motrin), or naproxen (Aleve), for cramps. Read and follow all instructions on the label. Do not take aspirin, because it can cause more bleeding. Do not take acetaminophen (Tylenol) and other acetaminophen containing medications (i.e. Percocet) at the same time. Episiotomy, Lacerations or Tears  · If you have stitches, they will dissolve on their own and do not need to be removed. · Put ice or a cold pack on your painful area for 10 to 20 minutes at a time, several times a day, for the first few days. Put a thin cloth between the ice and your skin. · Sit in a few inches of warm water (sitz bath) 3 times a day and after bowel movements. The warm water helps with pain and itching. If you do not have a tub, a warm shower might help. Breast fullness  · Your breasts may overfill (engorge) in the first few days after delivery. To help milk flow and to relieve pain, warm your breasts in the shower or by using warm, moist towels before nursing. · If you are not nursing, do not put warmth on your breasts or touch your breasts. Wear a tight bra or sports bra and use ice until the fullness goes away. This usually takes 2 to 3 days. · Put ice or a cold pack on your breast after nursing to reduce swelling and pain. Put a thin cloth between the ice and your skin. Activity  · Eat a balanced diet. Do not try to lose weight by cutting calories. Keep taking your prenatal vitamins, or take a multivitamin. · Get as much rest as you can. Try to take naps when your baby sleeps during the day. · Get some exercise every day. But do not do any heavy exercise until your doctor says it is okay. · Wait until you are healed (about 4 to 6 weeks) before you have sexual intercourse. Your doctor will tell you when it is okay to have sex. · Talk to your doctor about birth control. You can get pregnant even before your period returns. Also, you can get pregnant while you are breast-feeding.     Mental Health  · Many women get the \"baby blues\" during the first few days after childbirth. You may lose sleep, feel irritable, and cry easily. You may feel happy one minute and sad the next. Hormone changes are one cause of these emotional changes. Also, the demands of a new baby, along with visits from relatives or other family needs, add to a mother's stress. The \"baby blues\" often peak around the fourth day. Then they ease up in less than 2 weeks. · If your moodiness or anxiety lasts for more than 2 weeks, or if you feel like life is not worth living, you may have postpartum depression. This is different for each mother. Some mothers with serious depression may worry intensely about their infant's well-being. Others may feel distant from their child. Some mothers might even feel that they might harm their baby. A mother may have signs of paranoia, wondering if someone is watching her. · With all the changes in your life, you may not know if you are depressed. Pregnancy sometimes causes changes in how you feel that are similar to the symptoms of depression. · Symptoms of depression include:  · Feeling sad or hopeless and losing interest in daily activities. These are the most common symptoms of depression. · Sleeping too much or not enough. · Feeling tired. You may feel as if you have no energy. · Eating too much or too little. · [employer] (PSI) offers a Warm line; Chat with the Expert phone sessions; Information and Articles about Pregnancy and Postpartum Mood Disorders; Comprehensive List of Free Support Groups; Knowledgeable local coordinators who will offer support, information, and resources; Guide to Resources on Shoutlet; Calendar of events in the  mood disorders community; Latest News and Research; and [facility] [address] for [employer]. Remember - You are not alone; You are not to blame; With help, you will be well. 7-375-741-PPD(2890). WWW. POSTPARTUM. NET    · Writing or talking about death, such as writing suicide notes or talking about guns, knives, or pills. Keep the numbers for these national suicide hotlines: 8-328-725-TALK (9-671.954.6084) and 6-007-HVJFWLS (9-830.717.7196). If you or someone you know talks about suicide or feeling hopeless, get help right away. Constipation and Hemorrhoids  Drink plenty of fluids, enough so that your urine is light yellow or clear like water. If you have kidney, heart, or liver disease and have to limit fluids, talk with your doctor before you increase the amount of fluids you drink. · Eat plenty of fiber each day. Have a bran muffin or bran cereal for breakfast, and try eating a piece of fruit for a mid-afternoon snack. · For painful, itchy hemorrhoids, put ice or a cold pack on the area several times a day for 10 minutes at a time. Follow this by putting a warm compress on the area for another 10 to 20 minutes or by sitting in a shallow, warm bath. When should you call for help? Call 911 anytime you think you may need emergency care. For example, call if:  · You are thinking of hurting yourself, your baby, or anyone else. · You passed out (lost consciousness). · You have symptoms of a blood clot in your lung (called a pulmonary embolism). These may include:  · Sudden chest pain. · Trouble breathing. · Coughing up blood. Call your doctor now or seek immediate medical care if:  · You have severe vaginal bleeding. · You are soaking through a pad each hour for 2 or more hours. · Your vaginal bleeding seems to be getting heavier or is still bright red 4 days after delivery. · You are dizzy or lightheaded, or you feel like you may faint. · You are vomiting or cannot keep fluids down. · You have a fever. · You have new or more belly pain. · You pass tissue (not just blood). · Your vaginal discharge smells bad. · Your belly feels tender or full and hard. · Your breasts are continuously painful or red.   · You feel sad, anxious, or hopeless for more than a few days. · You have sudden, severe pain in your belly. · You have symptoms of a blood clot in your leg (called a deep vein thrombosis), such as:  · Pain in your calf, back of the knee, thigh, or groin. · Redness and swelling in your leg or groin. · You have symptoms of preeclampsia, such as:  · Sudden swelling of your face, hands, or feet. · New vision problems (such as dimness or blurring). · A severe headache. · Your blood pressure is higher than it should be or rises suddenly. · You have new nausea or vomiting. Watch closely for changes in your health, and be sure to contact your doctor if you have any problems. Additional Information:  Not applicable    These are general instructions for a healthy lifestyle:    No smoking/ No tobacco products/ Avoid exposure to second hand smoke    Surgeon General's Warning:  Quitting smoking now greatly reduces serious risk to your health. Obesity, smoking, and sedentary lifestyle greatly increases your risk for illness    A healthy diet, regular physical exercise & weight monitoring are important for maintaining a healthy lifestyle    Recognize signs and symptoms of STROKE:    F-face looks uneven    A-arms unable to move or move unevenly    S-speech slurred or non-existent    T-time-call 911 as soon as signs and symptoms begin - DO NOT go       back to bed or wait to see if you get better - TIME IS BRAIN. I have had the opportunity to make my options or choices for discharge. I have received and understand these instructions. Discharge Instructions for Vaginal Delivery    Patient ID:  Bella Damico  [id number]  10 y.o.  1990    Take Home Medications           Continue taking your prenatal vitamins if you are breastfeeding. Follow-up Appointment:  Follow-up with [facility] in 6 weeks. Follow-up care is a key part of your treatment and safety.  Be sure to make and go to all appointments, and call your doctor if you are having problems. Its also a good idea to know your test results and keep a list of the medicines you take. Activity  Avoid anything in your vagina for 6 weeks (no intercourse, tampons, or douching). You may drive unless you are taking prescription pain medications. Climbing stairs and light lifting are ok after a vaginal delivery unless your doctor tells you not to. You may gradually work up to exercise over the next few weeks, but take it easy- you'll be tired! Diet  You may eat a regular diet but you may want to avoid heavy, greasy foods and other foods that could increase constipation. Wound care  If you have stitches, continue to rinse with a squirt bottle of warm water each time you use the bathroom for about 2 weeks. Your stitches will gradually dissolve over several weeks. Sitz baths are also helpful to keep the wound clean, encourage healing, and to help with pain associated with the stitches or hemorrhoids. You can use either a sitz bath basin or a bathtub filled with 2-3\" inches of plain warm water. Soak for 10 minutes 3 times a day. Pain Management  If you were not given a prescription pain medication, you can take over the counter pain medicines like Tylenol (acetominophen), Advil or Motrin (ibuprofen). You can take acetominophen and ibuprofen together, alternating doses every few hours. You will get the most relief if you take the maximum dose:  · Tylenol or acetominophen 1000 mg every 6 hours (equivalent to 2 Extra Strength Tylenols every 6 hours)  · Motrin or Advil (generic ibuprofen) 800 mg every 8 hours (4 tablets or capsules every 8 hours)  If you were given a prescription pain medication, you can take ibuprofen along with it (doses as above), but not Tylenol. Use ibuprofen as the main medication, and take the prescription medication if needed for more severe pain not relieved by the ibuprofen.   Your goal should be to take only the minimum necessary amounts of the prescription medication (narcotic), as these pain medicines can be habit-forming and will worsen or cause constipation. Most patients will find that within a couple of days, their pain is adequately controlled using only over-the-counter medications. A heating pad can also be very helpful for cramping or back pain. Over-the-counter hemorrhoid wipes and ointments are fine to use if you have hemorrhoids. Constipation  You may find that bowel movements are irregular after delivery and that you have a tendency to be constipated. If you have stitches (and especially if you had a more severe tear called a third- or fourth-degree), your bowel movements will be more comfortable if they are soft. A stool softener such as Colace (docusate) can safely be taken daily if needed. If you become constipated you can use a laxative such as Dulcolax, Miralax or Milk of Magnesia. Don't wait until constipation is severe- take something sooner rather than later and you will feel much better! Your Recovery: What to Expect at Home  Delivering a baby is hard work and you probably aren't getting much sleep, so you will certainly be tired. Try to rest when you can and don't worry about doing housework or other tasks which can wait. If you're experiencing soreness or swelling in your bottom, this should improve over the next few days to 2 weeks. You are likely to have some back pain or general body aches or muscle soreness. This should improve with acetominophen or ibuprofen. If your legs were swollen during your pregnancy or as a result of IV fluids given during your hospital stay, this should go away in a few days to a week. Most women experience some form of the \"Baby Blues\" after having a baby. This means that you may feel emotional, tearful, frustrated, anxious, sad, and irritable some of the time. This is normal if it's not severe and should go away after about 2 weeks. Getting as much rest as you can will help.   Accept assistance from friends and family members so that you can take breaks from caring for the baby. Call your doctor if your symptoms seem severe, last more than 2 weeks, or seem to be getting worse instead of better. Get help immediately if you have thoughts of wanting to hurt yourself or others! When should you call for help? Call 911 anytime you think you may need emergency care. For example, call if:  You pass out (lose consciousness). You have sudden chest pain and shortness of breath, or you cough up blood. You have severe pain in your belly. Call your doctor now or seek immediate medical care if:  You have heavy vaginal bleeding that soaks one or more pads in an hour, or you have large clots (golf ball size or larger). Your have foul-smelling discharge from your vagina. You are sick to your stomach or cannot keep fluids down. You have pain that does not get better after you take pain medicine. You have signs of infection, such as: Increased pain in your abdomen or vaginal area  Red streaks, warmth, or tenderness of your breasts. A fever of 101 or greater (taken by mouth). You have signs of a blood clot, such as:  Pain in your calf, back of knee, thigh, or groin. Redness and swelling in your leg or groin. You have trouble passing urine or stool, especially if you have pain or swelling in your lower belly; or if you are unable to have a bowel movement after taking a laxative. You have a fast or pounding heartbeat.

## 2019-04-08 ENCOUNTER — HOSPITAL ENCOUNTER (OUTPATIENT)
Dept: PHYSICAL THERAPY | Age: 29
Discharge: HOME | End: 2019-04-08
Payer: COMMERCIAL

## 2019-04-08 PROCEDURE — 97162 PT EVAL MOD COMPLEX 30 MIN: CPT | Performed by: PHYSICAL MEDICINE & REHABILITATION

## 2019-04-08 PROCEDURE — 97110 THERAPEUTIC EXERCISES: CPT | Performed by: PHYSICAL MEDICINE & REHABILITATION

## 2019-04-08 PROCEDURE — 97140 MANUAL THERAPY 1/> REGIONS: CPT | Performed by: PHYSICAL MEDICINE & REHABILITATION

## 2019-04-08 NOTE — PROGRESS NOTES
[facility] [address], [address] [place] Phone: [phone]  Fax: [phone] Plan of Care/ Statement of Necessity for Physical Therapy Services 2-15 Patient name: Francheska Mullen  : 1990  Provider#: [id number] Referral source: Lakisha Escobedo MD     
Medical/Treatment Diagnosis: Low back pain [M54.5] Prior Hospitalization: see medical history Comorbidities: see chart Prior Level of Function: no pain with sitting Medications: Verified on Patient Summary List 
 
Start of Care: 19      Onset Date: 3 weeks The Plan of Care and following information is based on the information from the initial evaluation. Assessment/ key information: Pt 3 weeks post vaginal delivery presents with coccyx pain, rotated coccyx with increased ms guarding on palpation today. She had some relief with manual therapy and exercises today. Assessment is ongoing. She will benefit from skilled PT services to address her limitations and achieve her functional goals as stated on the plan of care. Evaluation Complexity History MEDIUM  Complexity : 1-2 comorbidities / personal factors will impact the outcome/ POC ; Examination MEDIUM Complexity : 3 Standardized tests and measures addressing body structure, function, activity limitation and / or participation in recreation  ;Presentation MEDIUM Complexity : Evolving with changing characteristics  ; Clinical Decision Making MEDIUM Complexity : FOTO score of 26-74 Overall Complexity Rating: MEDIUM Problem List: pain affecting function, decrease ROM, decrease strength, decrease ADL/ functional abilitiies, decrease activity tolerance and decrease flexibility/ joint mobility Treatment Plan may include any combination of the following: Therapeutic exercise, Therapeutic activities, Neuromuscular re-education, Physical agent/modality, Manual therapy, Patient education, Self Care training and Functional mobility training Patient / Family readiness to learn indicated by: asking questions Persons(s) to be included in education: patient (P) Barriers to Learning/Limitations: None Patient Goal (s): sit and nurse with no pain Patient Self Reported Health Status: excellent Rehabilitation Potential: excellent Short Term Goals: To be accomplished in 6 weeks: Pt will be independent with a progressive HEP Pt will demonstrate good posture with sitting, standing, transitional ADLs Pt will have no pain with palpation to coccyx/piriformis Pt will tolerate sitting with modifications with no increased pain Long Term Goals: To be accomplished in 12 weeks: Pt will demonstrate full functional ROM Pt will have no strength deficit through pelvic floor or transverse abdominus Pt will have no pain with sitting ad haris. Pt will have no pain with transitional movements. Frequency / Duration: Patient to be seen 1-2 times per week for 6-12 weeks. Patient/ Caregiver education and instruction: self care [x]  Plan of care has been reviewed with KENDY Chan PT, MSPT    4/8/2019  
 
________________________________________________________________________ I certify that the above Therapy Services are being furnished while the patient is under my care. I agree with the treatment plan and certify that this therapy is necessary. [de-identified] Signature:____________________  Date:____________Time: _________

## 2019-04-08 NOTE — PROGRESS NOTES
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 Overall pain is not different than when it started. She did have episiotomy and vacuum assist, pushed 5 hours. Pain is reproduced with transitional movements sit to stand/rolling in bed/shifting weight and upon first sitting. She is struggling to find a good sitting position to nurse. PLOF: no tailbone pain Mechanism of Injury: post delivery Previous Treatment/Compliance: none PMHx/Surgical Hx: above Work Hx: nurse - on maternity leave Living Situation: with spouse & infant Pt Goals: sit and move with no pain Barriers: none Motivation: good Substance use: none stated FABQ Score:  
Cognition: A & O x 4 OBJECTIVE/EXAMINATION Posture:  Mild forward head, forward rounded shoulder posture, sitting erect \"elevating coccyx\" Other Observations: L IC higher in standing, scoliotic curve - mild Gait and Functional Mobility: guarded with all transfers, slow to sit. Palpation: Tender at L coccyx base, L piriformis > R. Hypertonic R 
Joint Mobility: rotated coccyx, decreased flexion Flexibility:  Decreased L piriformis and L HS stretch compared to R. Lumbar AROM:   
    R  L Flexion    75% Extension   Full Side Bending   Full Rotation   Full Aberrant movements: Painful arc: [] Yes  [x] No 
Instability catch: [] Yes  [x] No 
Difficulty returning from flexion: [] Yes  [x] No 
Reversal of lumbopelvic rhythm: [] Yes  [x] No 
 
 
Modality rationale: decrease pain and increase tissue extensibility to improve the patients ability to sit with no pain Min Type Additional Details  
 [] Estim: []Att   []Unatt        []TENS instruct []IFC  []Premod   []NMES []Other:  []w/US   []w/ice   []w/heat Position: Location:  
 []  Traction: [] Cervical       []Lumbar 
                     [] Prone          []Supine []Intermittent   []Continuous Lbs: 
[] before manual 
[] after manual 
[]w/heat  
 []  Ultrasound: []Continuous   [] Pulsed at:  
                         []1MHz   []3MHz Location: 
W/cm2:  
 []  Paraffin Location: 
[]w/heat  
10 []  Ice     [x]  Heat 
[]  Ice massage Position:  prone Location: sacrum/coccyx   
 []  Laser 
[]  Other: Position: Location:  
 []  Vasopneumatic Device Pressure:       [] lo [] med [] hi  
Temperature:   
[x] Skin assessment post-treatment:  [x]intact []redness- no adverse reaction 
  []redness  adverse reaction:  
 
15 min Therapeutic Exercise:  [x] See flow sheet :  Hip/glut stretching per flow sheet Rationale: increase ROM and improve coordination to improve the patients ability to sit with no pain 15 min Manual Therapy:  TPR to L piriformis, L glut, grade 2/3 rotation mobs coccyx with active contract/relax. Rationale: decrease pain, increase ROM and decrease trigger points  to improve the patients ability to sit with no pain With 
 [x] TE 
 [] TA 
 [] neuro 
 [] other: Patient Education: [x] Review HEP [] Progressed/Changed HEP based on:  
[] positioning   [] body mechanics   [] transfers   [] heat/ice application   
[x] other:   
 
 
Other Objective/Functional Measures:  
15 mins late to appt, assessment limited today, ongoing. Noticed improved sitting tolerance by 25% post manual treatment and stretches today. Pain Level (0-10 scale) post treatment: 3 
 
 
ASSESSMENT:   Pt 3 weeks post vaginal delivery presents with coccyx pain, rotated coccyx with increased ms guarding on palpation today. She had some relief with manual therapy and exercises today. She will benefit from skilled PT services to address her limitations and achieve her functional goals as stated on the plan of care. [x]  See Plan of Care Dottie Naylor, PT, MSPT  4/8/2019

## 2019-04-15 ENCOUNTER — APPOINTMENT (OUTPATIENT)
Dept: PHYSICAL THERAPY | Age: 29
End: 2019-04-15
Payer: COMMERCIAL

## 2019-04-22 ENCOUNTER — HOSPITAL ENCOUNTER (OUTPATIENT)
Dept: PHYSICAL THERAPY | Age: 29
Discharge: HOME | End: 2019-04-22
Payer: COMMERCIAL

## 2019-04-22 PROCEDURE — 97110 THERAPEUTIC EXERCISES: CPT | Performed by: PHYSICAL MEDICINE & REHABILITATION

## 2019-04-22 PROCEDURE — 97140 MANUAL THERAPY 1/> REGIONS: CPT | Performed by: PHYSICAL MEDICINE & REHABILITATION

## 2019-04-22 NOTE — PROGRESS NOTES
PT DAILY TREATMENT NOTE 2-15 Patient Name: Eladio May Date:2019 : 1990 [x]  Patient  Verified Payor: BLUE CROSS / Plan: [place] [employer] / Product Type: PPO / In time: 0100  Out time: 0200 Total Treatment Time (min): 60 Visit #:  2 Treatment Area: Low back pain [M54.5] SUBJECTIVE Pain Level (0-10 scale):  2/10 Any medication changes, allergies to medications, adverse drug reactions, diagnosis change, or new procedure performed?: [x] No    [] Yes (see summary sheet for update) Subjective functional status/changes:   [] No changes reported Having some relief. Stretches are helping. She now has pain only if she sits more than 30 minutes or more. No longer with pain on initially sitting down. OBJECTIVE 2 finger diastasis recti at [employer] and above. Non tender 45 min Therapeutic Exercise:  [] See flow sheet :  TrAB isolation and strengthening exs in supine hook lying position. JERI correction Rationale: increase strength and improve coordination to improve the patients ability to transition with no pelvic/abdominal discomfort 15 min Manual Therapy:   STM/TPR L piriformis, hamstring, glut. Manual stretch Rationale: decrease pain, increase tissue extensibility and decrease trigger points  to improve the patients ability to transition with no pain With 
 [] TE 
 [] TA 
 [] neuro [x] other: Patient Education: [x] Review HEP [x] Progressed/Changed HEP based on:  
[] positioning   [] body mechanics   [] transfers   [] heat/ice application   
[x] other:   See upgraded HEP in chart  Added TrAB strenghtening exs. Other Objective/Functional Measures:  Demonstrates good TrAB isolation and recruitment post instruction today.      
 
Pain Level (0-10 scale) post treatment:   
 
ASSESSMENT/Changes in Function:  
 
Patient will continue to benefit from skilled PT services to modify and progress therapeutic interventions, address functional mobility deficits, address strength deficits, analyze and address soft tissue restrictions, analyze and cue movement patterns, analyze and modify body mechanics/ergonomics and assess and modify postural abnormalities to attain remaining goals. [x]  See Plan of Care 
[]  See progress note/recertification 
[]  See Discharge Summary Progress towards goals / Updated goals:Able to advance exercises today with good tolerance. Pt continues to need instruction for correct exercise form and performance. Continues to demonstrate good potential to achieve functional goals stated on the plan of care. PLAN [x]  Upgrade activities as tolerated     []  Continue plan of care 
[]  Update interventions per flow sheet      
[]  Discharge due to:_ 
[]  Other:_   
 
Jesus Fraire, PT, MSPT  4/22/2019

## 2019-06-12 NOTE — PROGRESS NOTES
[address] [place] [address] [place] [address] 58  Phone: [phone]  Fax: [phone]    Discharge Summary  2-15    Patient name: Josefina Marvin  : 1990  Provider#: [id number]  Referral source: Pedro Ritter MD      Medical/Treatment Diagnosis: Low back pain [M54.5]     Prior Hospitalization: see medical history     Comorbidities: See Plan of Care  Prior Level of Function:See Plan of Care  Medications: Verified on Patient Summary List    Start of Care: 19       Onset Date: 3 weeks prior to eval   Visits from Start of Care: 2     Missed Visits:  1  Reporting Period :  19 - 19       ASSESSMENT/SUMMARY OF CARE:   Pt was referred to PT for evaluation and treatment of coccyx and pelvic pain post partum. She was seen for 2 visits with some improvement in symptoms reported. She self discharged prior to full goal attainment. Treatment consisted of manual therapy, therapeutic exercise, therapeutic activity, patient education, posture and body mechanics training, modalities, home exercise program development and progression. She is discharged with partial goals met. Current functional status is not known. Short Term Goals: To be accomplished in 6 weeks:  Pt will be independent with a progressive HEP  MEt    Pt will demonstrate good posture with sitting, standing, transitional ADLs  IMPROVED  Pt will have no pain with palpation to coccyx/piriformis IMPROVED  Pt will tolerate sitting with modifications with no increased pain  MET     Long Term Goals: To be accomplished in 12 weeks:  NOT MET  Pt will demonstrate full functional ROM  Pt will have no strength deficit through pelvic floor or transverse abdominus  Pt will have no pain with sitting ad haris. Pt will have no pain with transitional movements.      RECOMMENDATIONS:  [x]Discontinue therapy: []Patient has reached or is progressing toward set goals      [x]Patient is non-compliant or has abdicated      []Due to lack of appreciable progress towards set goals      []Other    Sarah Aldana, PT, MSPT    6/12/2019

## 2022-03-18 PROBLEM — N20.0 KIDNEY STONE COMPLICATING PREGNANCY, THIRD TRIMESTER: Status: ACTIVE | Noted: 2019-01-11

## 2022-03-18 PROBLEM — O99.891 KIDNEY STONE COMPLICATING PREGNANCY, THIRD TRIMESTER: Status: ACTIVE | Noted: 2019-01-11

## 2022-03-18 PROBLEM — O26.833 KIDNEY STONE COMPLICATING PREGNANCY, THIRD TRIMESTER: Status: ACTIVE | Noted: 2019-01-11

## 2022-03-19 PROBLEM — Z34.90 PREGNANCY: Status: ACTIVE | Noted: 2019-03-17

## 2025-09-01 ENCOUNTER — OFFICE VISIT (OUTPATIENT)
Age: 35
End: 2025-09-01

## 2025-09-01 VITALS
SYSTOLIC BLOOD PRESSURE: 117 MMHG | TEMPERATURE: 98.7 F | HEART RATE: 85 BPM | RESPIRATION RATE: 14 BRPM | OXYGEN SATURATION: 100 % | WEIGHT: 130 LBS | DIASTOLIC BLOOD PRESSURE: 77 MMHG

## 2025-09-01 DIAGNOSIS — N76.4 LEFT GENITAL LABIAL ABSCESS: Primary | ICD-10-CM

## 2025-09-01 PROBLEM — O99.891 KIDNEY STONE COMPLICATING PREGNANCY, THIRD TRIMESTER: Status: RESOLVED | Noted: 2019-01-11 | Resolved: 2025-09-01

## 2025-09-01 PROBLEM — Z34.90 PREGNANCY: Status: RESOLVED | Noted: 2019-03-17 | Resolved: 2025-09-01

## 2025-09-01 PROBLEM — N20.0 KIDNEY STONE COMPLICATING PREGNANCY, THIRD TRIMESTER: Status: RESOLVED | Noted: 2019-01-11 | Resolved: 2025-09-01

## 2025-09-01 RX ORDER — ACETAMINOPHEN 500 MG
1000 TABLET ORAL EVERY 8 HOURS PRN
Qty: 30 TABLET | Refills: 0 | Status: SHIPPED | OUTPATIENT
Start: 2025-09-01 | End: 2025-09-11

## 2025-09-01 RX ORDER — IBUPROFEN 600 MG/1
600 TABLET, FILM COATED ORAL EVERY 8 HOURS PRN
Qty: 30 TABLET | Refills: 0 | Status: SHIPPED | OUTPATIENT
Start: 2025-09-01 | End: 2025-09-11

## 2025-09-01 RX ORDER — DOXYCYCLINE HYCLATE 100 MG
100 TABLET ORAL 2 TIMES DAILY
Qty: 20 TABLET | Refills: 0 | Status: SHIPPED | OUTPATIENT
Start: 2025-09-01 | End: 2025-09-11

## 2025-09-04 ENCOUNTER — TELEPHONE (OUTPATIENT)
Age: 35
End: 2025-09-04